# Patient Record
Sex: MALE | Race: ASIAN | NOT HISPANIC OR LATINO
[De-identification: names, ages, dates, MRNs, and addresses within clinical notes are randomized per-mention and may not be internally consistent; named-entity substitution may affect disease eponyms.]

---

## 2018-05-17 ENCOUNTER — RECORDS - HEALTHEAST (OUTPATIENT)
Dept: ADMINISTRATIVE | Facility: OTHER | Age: 18
End: 2018-05-17

## 2018-05-18 ENCOUNTER — RECORDS - HEALTHEAST (OUTPATIENT)
Dept: ADMINISTRATIVE | Facility: OTHER | Age: 18
End: 2018-05-18

## 2018-05-19 ENCOUNTER — RECORDS - HEALTHEAST (OUTPATIENT)
Dept: ADMINISTRATIVE | Facility: OTHER | Age: 18
End: 2018-05-19

## 2018-05-20 ENCOUNTER — RECORDS - HEALTHEAST (OUTPATIENT)
Dept: ADMINISTRATIVE | Facility: OTHER | Age: 18
End: 2018-05-20

## 2018-05-21 ENCOUNTER — RECORDS - HEALTHEAST (OUTPATIENT)
Dept: ADMINISTRATIVE | Facility: OTHER | Age: 18
End: 2018-05-21

## 2018-05-22 ENCOUNTER — RECORDS - HEALTHEAST (OUTPATIENT)
Dept: ADMINISTRATIVE | Facility: OTHER | Age: 18
End: 2018-05-22

## 2018-05-23 ENCOUNTER — RECORDS - HEALTHEAST (OUTPATIENT)
Dept: ADMINISTRATIVE | Facility: OTHER | Age: 18
End: 2018-05-23

## 2018-05-25 ENCOUNTER — RECORDS - HEALTHEAST (OUTPATIENT)
Dept: ADMINISTRATIVE | Facility: OTHER | Age: 18
End: 2018-05-25

## 2018-05-26 ENCOUNTER — RECORDS - HEALTHEAST (OUTPATIENT)
Dept: ADMINISTRATIVE | Facility: OTHER | Age: 18
End: 2018-05-26

## 2018-05-27 ENCOUNTER — RECORDS - HEALTHEAST (OUTPATIENT)
Dept: ADMINISTRATIVE | Facility: OTHER | Age: 18
End: 2018-05-27

## 2018-05-28 ENCOUNTER — RECORDS - HEALTHEAST (OUTPATIENT)
Dept: ADMINISTRATIVE | Facility: OTHER | Age: 18
End: 2018-05-28

## 2018-05-29 ENCOUNTER — RECORDS - HEALTHEAST (OUTPATIENT)
Dept: ADMINISTRATIVE | Facility: OTHER | Age: 18
End: 2018-05-29

## 2019-01-04 ENCOUNTER — COMMUNICATION - HEALTHEAST (OUTPATIENT)
Dept: FAMILY MEDICINE | Facility: CLINIC | Age: 19
End: 2019-01-04

## 2019-01-04 ENCOUNTER — COMMUNICATION - HEALTHEAST (OUTPATIENT)
Dept: NURSING | Facility: CLINIC | Age: 19
End: 2019-01-04

## 2019-01-04 ENCOUNTER — OFFICE VISIT - HEALTHEAST (OUTPATIENT)
Dept: FAMILY MEDICINE | Facility: CLINIC | Age: 19
End: 2019-01-04

## 2019-01-04 DIAGNOSIS — S06.9X5D TRAUMATIC BRAIN INJURY WITH PROLONGED (MORE THAN 24 HOURS) LOSS OF CONSCIOUSNESS WITH RETURN TO PRE-EXISTING CONSCIOUS LEVEL, SUBSEQUENT ENCOUNTER: ICD-10-CM

## 2019-01-04 DIAGNOSIS — R63.0 LOSS OF APPETITE: ICD-10-CM

## 2019-01-04 DIAGNOSIS — R45.6 VIOLENT BEHAVIOR: ICD-10-CM

## 2019-01-04 DIAGNOSIS — R41.3 POOR MEMORY: ICD-10-CM

## 2019-01-04 DIAGNOSIS — S06.9X3A TRAUMATIC BRAIN INJURY, WITH LOSS OF CONSCIOUSNESS OF 1 HOUR TO 5 HOURS 59 MINUTES, INITIAL ENCOUNTER (H): ICD-10-CM

## 2019-01-04 DIAGNOSIS — Z76.89 ESTABLISHING CARE WITH NEW DOCTOR, ENCOUNTER FOR: ICD-10-CM

## 2019-01-04 DIAGNOSIS — R26.89 POOR BALANCE: ICD-10-CM

## 2019-01-04 LAB
ALBUMIN SERPL-MCNC: 4.5 G/DL (ref 3.5–5)
ALBUMIN UR-MCNC: NEGATIVE MG/DL
ALP SERPL-CCNC: 90 U/L (ref 45–120)
ALT SERPL W P-5'-P-CCNC: 22 U/L (ref 0–45)
AMPHETAMINES UR QL SCN: NORMAL
ANION GAP SERPL CALCULATED.3IONS-SCNC: 9 MMOL/L (ref 5–18)
APPEARANCE UR: CLEAR
AST SERPL W P-5'-P-CCNC: 21 U/L (ref 0–40)
BARBITURATES UR QL: NORMAL
BENZODIAZ UR QL: NORMAL
BILIRUB SERPL-MCNC: 0.4 MG/DL (ref 0–1)
BILIRUB UR QL STRIP: NEGATIVE
BUN SERPL-MCNC: 9 MG/DL (ref 8–22)
CALCIUM SERPL-MCNC: 10 MG/DL (ref 8.5–10.5)
CANNABINOIDS UR QL SCN: NORMAL
CHLORIDE BLD-SCNC: 102 MMOL/L (ref 98–107)
CO2 SERPL-SCNC: 31 MMOL/L (ref 22–31)
COCAINE UR QL: NORMAL
COLOR UR AUTO: YELLOW
CREAT SERPL-MCNC: 0.78 MG/DL (ref 0.7–1.3)
CREAT UR-MCNC: 19.8 MG/DL
GFR SERPL CREATININE-BSD FRML MDRD: >60 ML/MIN/1.73M2
GLUCOSE BLD-MCNC: 92 MG/DL (ref 70–125)
GLUCOSE UR STRIP-MCNC: NEGATIVE MG/DL
HGB UR QL STRIP: NEGATIVE
HIV 1+2 AB+HIV1 P24 AG SERPL QL IA: NEGATIVE
KETONES UR STRIP-MCNC: NEGATIVE MG/DL
LDLC SERPL CALC-MCNC: 93 MG/DL
LEUKOCYTE ESTERASE UR QL STRIP: NEGATIVE
METHADONE UR QL SCN: NORMAL
NITRATE UR QL: NEGATIVE
OPIATES UR QL SCN: NORMAL
OXYCODONE UR QL: NORMAL
PCP UR QL SCN: NORMAL
PH UR STRIP: 8 [PH] (ref 5–8)
POTASSIUM BLD-SCNC: 3.8 MMOL/L (ref 3.5–5)
PROT SERPL-MCNC: 7.8 G/DL (ref 6–8)
SODIUM SERPL-SCNC: 142 MMOL/L (ref 136–145)
SP GR UR STRIP: 1.01 (ref 1–1.03)
TSH SERPL DL<=0.005 MIU/L-ACNC: 1.31 UIU/ML (ref 0.3–5)
UROBILINOGEN UR STRIP-ACNC: NORMAL

## 2019-01-04 ASSESSMENT — MIFFLIN-ST. JEOR: SCORE: 1356.91

## 2019-01-05 LAB — HBV SURFACE AG SERPL QL IA: NEGATIVE

## 2019-01-06 LAB — VZV IGG SER QL IA: POSITIVE

## 2019-01-07 LAB
BASOPHILS # BLD AUTO: 0.1 THOU/UL (ref 0–0.2)
BASOPHILS NFR BLD AUTO: 1 % (ref 0–2)
EOSINOPHIL # BLD AUTO: 0.2 THOU/UL (ref 0–0.4)
EOSINOPHIL NFR BLD AUTO: 2 % (ref 0–6)
ERYTHROCYTE [DISTWIDTH] IN BLOOD BY AUTOMATED COUNT: 12.4 % (ref 11–14.5)
HAV IGG SER QL IA: POSITIVE
HBV CORE AB SERPL QL IA: NEGATIVE
HBV SURFACE AB SERPL IA-ACNC: NEGATIVE M[IU]/ML
HCT VFR BLD AUTO: 45.2 % (ref 40–54)
HCV AB SERPL QL IA: NEGATIVE
HGB BLD-MCNC: 15.3 G/DL (ref 14–18)
LYMPHOCYTES # BLD AUTO: 2.5 THOU/UL (ref 0.8–4.4)
LYMPHOCYTES NFR BLD AUTO: 26 % (ref 20–40)
MCH RBC QN AUTO: 30.8 PG (ref 27–34)
MCHC RBC AUTO-ENTMCNC: 33.8 G/DL (ref 32–36)
MCV RBC AUTO: 91 FL (ref 80–100)
MONOCYTES # BLD AUTO: 0.9 THOU/UL (ref 0–0.9)
MONOCYTES NFR BLD AUTO: 9 % (ref 2–10)
NEUTROPHILS # BLD AUTO: 6 THOU/UL (ref 2–7.7)
NEUTROPHILS NFR BLD AUTO: 62 % (ref 50–70)
PLATELET # BLD AUTO: 307 THOU/UL (ref 140–440)
PMV BLD AUTO: 7 FL (ref 7–10)
RBC # BLD AUTO: 4.97 MILL/UL (ref 4.4–6.2)
STRONGYLOIDES IGG SER IA-ACNC: 0 IV
WBC: 9.7 THOU/UL (ref 4–11)

## 2019-01-08 ENCOUNTER — MEDICAL CORRESPONDENCE (OUTPATIENT)
Dept: HEALTH INFORMATION MANAGEMENT | Facility: CLINIC | Age: 19
End: 2019-01-08

## 2019-01-08 ENCOUNTER — TRANSFERRED RECORDS (OUTPATIENT)
Dept: HEALTH INFORMATION MANAGEMENT | Facility: CLINIC | Age: 19
End: 2019-01-08

## 2019-01-08 ENCOUNTER — AMBULATORY - HEALTHEAST (OUTPATIENT)
Dept: CARE COORDINATION | Facility: CLINIC | Age: 19
End: 2019-01-08

## 2019-01-08 ENCOUNTER — COMMUNICATION - HEALTHEAST (OUTPATIENT)
Dept: FAMILY MEDICINE | Facility: CLINIC | Age: 19
End: 2019-01-08

## 2019-01-08 ENCOUNTER — AMBULATORY - HEALTHEAST (OUTPATIENT)
Dept: FAMILY MEDICINE | Facility: CLINIC | Age: 19
End: 2019-01-08

## 2019-01-08 DIAGNOSIS — S06.2X5S: ICD-10-CM

## 2019-01-09 ENCOUNTER — COMMUNICATION - HEALTHEAST (OUTPATIENT)
Dept: FAMILY MEDICINE | Facility: CLINIC | Age: 19
End: 2019-01-09

## 2019-01-09 ENCOUNTER — AMBULATORY - HEALTHEAST (OUTPATIENT)
Dept: CARE COORDINATION | Facility: CLINIC | Age: 19
End: 2019-01-09

## 2019-01-10 LAB — SCHISTOSOMA IGG SER QL: NEGATIVE

## 2019-01-15 ENCOUNTER — COMMUNICATION - HEALTHEAST (OUTPATIENT)
Dept: FAMILY MEDICINE | Facility: CLINIC | Age: 19
End: 2019-01-15

## 2019-01-15 ENCOUNTER — COMMUNICATION - HEALTHEAST (OUTPATIENT)
Dept: NURSING | Facility: CLINIC | Age: 19
End: 2019-01-15

## 2019-01-18 ENCOUNTER — COMMUNICATION - HEALTHEAST (OUTPATIENT)
Dept: CARE COORDINATION | Facility: CLINIC | Age: 19
End: 2019-01-18

## 2019-01-21 ENCOUNTER — AMBULATORY - HEALTHEAST (OUTPATIENT)
Dept: NURSING | Facility: CLINIC | Age: 19
End: 2019-01-21

## 2019-01-22 ENCOUNTER — AMBULATORY - HEALTHEAST (OUTPATIENT)
Dept: FAMILY MEDICINE | Facility: CLINIC | Age: 19
End: 2019-01-22

## 2019-01-24 ENCOUNTER — COMMUNICATION - HEALTHEAST (OUTPATIENT)
Dept: NURSING | Facility: CLINIC | Age: 19
End: 2019-01-24

## 2019-01-25 ENCOUNTER — OFFICE VISIT - HEALTHEAST (OUTPATIENT)
Dept: FAMILY MEDICINE | Facility: CLINIC | Age: 19
End: 2019-01-25

## 2019-01-25 DIAGNOSIS — S06.9X3A TRAUMATIC BRAIN INJURY, WITH LOSS OF CONSCIOUSNESS OF 1 HOUR TO 5 HOURS 59 MINUTES, INITIAL ENCOUNTER (H): ICD-10-CM

## 2019-01-25 DIAGNOSIS — E86.0 DEHYDRATION: ICD-10-CM

## 2019-01-25 DIAGNOSIS — R63.0 LOSS OF APPETITE: ICD-10-CM

## 2019-01-25 DIAGNOSIS — R26.89 POOR BALANCE: ICD-10-CM

## 2019-01-25 DIAGNOSIS — G47.00 INSOMNIA, UNSPECIFIED TYPE: ICD-10-CM

## 2019-01-25 DIAGNOSIS — F29 PSYCHOSIS, UNSPECIFIED PSYCHOSIS TYPE (H): ICD-10-CM

## 2019-01-25 DIAGNOSIS — Z23 NEED FOR IMMUNIZATION AGAINST INFLUENZA: ICD-10-CM

## 2019-01-25 DIAGNOSIS — F39 EPISODIC MOOD DISORDER (H): ICD-10-CM

## 2019-01-25 DIAGNOSIS — Z23 NEED FOR HEPATITIS B VACCINATION: ICD-10-CM

## 2019-01-25 ASSESSMENT — MIFFLIN-ST. JEOR: SCORE: 1346.17

## 2019-01-29 ENCOUNTER — COMMUNICATION - HEALTHEAST (OUTPATIENT)
Dept: NURSING | Facility: CLINIC | Age: 19
End: 2019-01-29

## 2019-01-29 ENCOUNTER — OFFICE VISIT - HEALTHEAST (OUTPATIENT)
Dept: BEHAVIORAL HEALTH | Facility: CLINIC | Age: 19
End: 2019-01-29

## 2019-01-29 DIAGNOSIS — F41.9 ANXIETY: ICD-10-CM

## 2019-01-29 DIAGNOSIS — F29 PSYCHOSIS, UNSPECIFIED PSYCHOSIS TYPE (H): ICD-10-CM

## 2019-01-29 DIAGNOSIS — R26.89 BALANCE PROBLEMS: ICD-10-CM

## 2019-01-29 DIAGNOSIS — F39 EPISODIC MOOD DISORDER (H): ICD-10-CM

## 2019-01-29 DIAGNOSIS — G47.00 INSOMNIA, UNSPECIFIED TYPE: ICD-10-CM

## 2019-01-29 DIAGNOSIS — S06.9X3S: ICD-10-CM

## 2019-01-29 ASSESSMENT — MIFFLIN-ST. JEOR: SCORE: 1359.6

## 2019-01-30 ENCOUNTER — COMMUNICATION - HEALTHEAST (OUTPATIENT)
Dept: NURSING | Facility: CLINIC | Age: 19
End: 2019-01-30

## 2019-01-30 ENCOUNTER — AMBULATORY - HEALTHEAST (OUTPATIENT)
Dept: NURSING | Facility: CLINIC | Age: 19
End: 2019-01-30

## 2019-01-31 ENCOUNTER — COMMUNICATION - HEALTHEAST (OUTPATIENT)
Dept: NURSING | Facility: CLINIC | Age: 19
End: 2019-01-31

## 2019-02-01 ENCOUNTER — AMBULATORY - HEALTHEAST (OUTPATIENT)
Dept: FAMILY MEDICINE | Facility: CLINIC | Age: 19
End: 2019-02-01

## 2019-02-01 DIAGNOSIS — R29.898 WEAKNESS OF LEFT LEG: ICD-10-CM

## 2019-02-01 DIAGNOSIS — R26.89 LIMP: ICD-10-CM

## 2019-02-05 ENCOUNTER — OFFICE VISIT - HEALTHEAST (OUTPATIENT)
Dept: FAMILY MEDICINE | Facility: CLINIC | Age: 19
End: 2019-02-05

## 2019-02-05 DIAGNOSIS — F29 PSYCHOSIS, UNSPECIFIED PSYCHOSIS TYPE (H): ICD-10-CM

## 2019-02-05 DIAGNOSIS — K59.01 SLOW TRANSIT CONSTIPATION: ICD-10-CM

## 2019-02-05 DIAGNOSIS — R45.6 VIOLENT BEHAVIOR: ICD-10-CM

## 2019-02-05 DIAGNOSIS — R41.3 POOR MEMORY: ICD-10-CM

## 2019-02-05 DIAGNOSIS — S06.9X3A TRAUMATIC BRAIN INJURY, WITH LOSS OF CONSCIOUSNESS OF 1 HOUR TO 5 HOURS 59 MINUTES, INITIAL ENCOUNTER (H): ICD-10-CM

## 2019-02-05 DIAGNOSIS — R26.89 POOR BALANCE: ICD-10-CM

## 2019-02-05 ASSESSMENT — MIFFLIN-ST. JEOR: SCORE: 1351.07

## 2019-02-06 ENCOUNTER — COMMUNICATION - HEALTHEAST (OUTPATIENT)
Dept: NURSING | Facility: CLINIC | Age: 19
End: 2019-02-06

## 2019-02-06 ENCOUNTER — AMBULATORY - HEALTHEAST (OUTPATIENT)
Dept: NURSING | Facility: CLINIC | Age: 19
End: 2019-02-06

## 2019-02-06 ENCOUNTER — COMMUNICATION - HEALTHEAST (OUTPATIENT)
Dept: CARE COORDINATION | Facility: CLINIC | Age: 19
End: 2019-02-06

## 2019-02-06 DIAGNOSIS — F39 EPISODIC MOOD DISORDER (H): ICD-10-CM

## 2019-02-12 ENCOUNTER — COMMUNICATION - HEALTHEAST (OUTPATIENT)
Dept: NURSING | Facility: CLINIC | Age: 19
End: 2019-02-12

## 2019-02-13 ENCOUNTER — AMBULATORY - HEALTHEAST (OUTPATIENT)
Dept: NURSING | Facility: CLINIC | Age: 19
End: 2019-02-13

## 2019-02-15 ENCOUNTER — OFFICE VISIT - HEALTHEAST (OUTPATIENT)
Dept: FAMILY MEDICINE | Facility: CLINIC | Age: 19
End: 2019-02-15

## 2019-02-15 ENCOUNTER — COMMUNICATION - HEALTHEAST (OUTPATIENT)
Dept: FAMILY MEDICINE | Facility: CLINIC | Age: 19
End: 2019-02-15

## 2019-02-15 DIAGNOSIS — R41.3 POOR MEMORY: ICD-10-CM

## 2019-02-15 DIAGNOSIS — F29 PSYCHOSIS, UNSPECIFIED PSYCHOSIS TYPE (H): ICD-10-CM

## 2019-02-15 DIAGNOSIS — F41.9 ANXIETY: ICD-10-CM

## 2019-02-15 DIAGNOSIS — R45.6 VIOLENT BEHAVIOR: ICD-10-CM

## 2019-02-15 DIAGNOSIS — S06.9X0D TRAUMATIC BRAIN INJURY, WITHOUT LOSS OF CONSCIOUSNESS, SUBSEQUENT ENCOUNTER: ICD-10-CM

## 2019-02-15 DIAGNOSIS — S06.9X3A TRAUMATIC BRAIN INJURY, WITH LOSS OF CONSCIOUSNESS OF 1 HOUR TO 5 HOURS 59 MINUTES, INITIAL ENCOUNTER (H): ICD-10-CM

## 2019-02-15 DIAGNOSIS — R26.89 POOR BALANCE: ICD-10-CM

## 2019-02-15 DIAGNOSIS — K59.01 SLOW TRANSIT CONSTIPATION: ICD-10-CM

## 2019-02-15 RX ORDER — HYDROXYZINE HYDROCHLORIDE 25 MG/1
25 TABLET, FILM COATED ORAL 2 TIMES DAILY PRN
Qty: 90 TABLET | Refills: 6 | Status: SHIPPED | OUTPATIENT
Start: 2019-02-15

## 2019-02-15 ASSESSMENT — MIFFLIN-ST. JEOR: SCORE: 1359.6

## 2019-02-18 ENCOUNTER — COMMUNICATION - HEALTHEAST (OUTPATIENT)
Dept: NURSING | Facility: CLINIC | Age: 19
End: 2019-02-18

## 2019-02-18 ENCOUNTER — AMBULATORY - HEALTHEAST (OUTPATIENT)
Dept: CARE COORDINATION | Facility: CLINIC | Age: 19
End: 2019-02-18

## 2019-02-20 ENCOUNTER — COMMUNICATION - HEALTHEAST (OUTPATIENT)
Dept: NURSING | Facility: CLINIC | Age: 19
End: 2019-02-20

## 2019-02-21 ENCOUNTER — COMMUNICATION - HEALTHEAST (OUTPATIENT)
Dept: FAMILY MEDICINE | Facility: CLINIC | Age: 19
End: 2019-02-21

## 2019-02-26 ENCOUNTER — COMMUNICATION - HEALTHEAST (OUTPATIENT)
Dept: NURSING | Facility: CLINIC | Age: 19
End: 2019-02-26

## 2019-03-06 ENCOUNTER — COMMUNICATION - HEALTHEAST (OUTPATIENT)
Dept: NURSING | Facility: CLINIC | Age: 19
End: 2019-03-06

## 2019-03-06 ENCOUNTER — OFFICE VISIT - HEALTHEAST (OUTPATIENT)
Dept: FAMILY MEDICINE | Facility: CLINIC | Age: 19
End: 2019-03-06

## 2019-03-06 DIAGNOSIS — R41.3 POOR MEMORY: ICD-10-CM

## 2019-03-06 DIAGNOSIS — F29 PSYCHOSIS, UNSPECIFIED PSYCHOSIS TYPE (H): ICD-10-CM

## 2019-03-06 DIAGNOSIS — Z00.00 HEALTHCARE MAINTENANCE: ICD-10-CM

## 2019-03-06 DIAGNOSIS — S06.9X3A TRAUMATIC BRAIN INJURY, WITH LOSS OF CONSCIOUSNESS OF 1 HOUR TO 5 HOURS 59 MINUTES, INITIAL ENCOUNTER (H): ICD-10-CM

## 2019-03-06 DIAGNOSIS — R45.6 VIOLENT BEHAVIOR: ICD-10-CM

## 2019-03-06 DIAGNOSIS — K59.01 SLOW TRANSIT CONSTIPATION: ICD-10-CM

## 2019-03-06 ASSESSMENT — MIFFLIN-ST. JEOR: SCORE: 1350.53

## 2019-03-13 ENCOUNTER — COMMUNICATION - HEALTHEAST (OUTPATIENT)
Dept: NURSING | Facility: CLINIC | Age: 19
End: 2019-03-13

## 2019-03-20 ENCOUNTER — COMMUNICATION - HEALTHEAST (OUTPATIENT)
Dept: NURSING | Facility: CLINIC | Age: 19
End: 2019-03-20

## 2019-03-27 ENCOUNTER — COMMUNICATION - HEALTHEAST (OUTPATIENT)
Dept: FAMILY MEDICINE | Facility: CLINIC | Age: 19
End: 2019-03-27

## 2019-03-27 ENCOUNTER — OFFICE VISIT - HEALTHEAST (OUTPATIENT)
Dept: FAMILY MEDICINE | Facility: CLINIC | Age: 19
End: 2019-03-27

## 2019-03-27 DIAGNOSIS — R41.3 POOR MEMORY: ICD-10-CM

## 2019-03-27 DIAGNOSIS — F29 PSYCHOSIS, UNSPECIFIED PSYCHOSIS TYPE (H): ICD-10-CM

## 2019-03-27 DIAGNOSIS — R45.6 VIOLENT BEHAVIOR: ICD-10-CM

## 2019-03-27 DIAGNOSIS — S06.9X3A TRAUMATIC BRAIN INJURY, WITH LOSS OF CONSCIOUSNESS OF 1 HOUR TO 5 HOURS 59 MINUTES, INITIAL ENCOUNTER (H): ICD-10-CM

## 2019-03-27 DIAGNOSIS — F39 EPISODIC MOOD DISORDER (H): ICD-10-CM

## 2019-03-27 DIAGNOSIS — F32.1 CURRENT MODERATE EPISODE OF MAJOR DEPRESSIVE DISORDER, UNSPECIFIED WHETHER RECURRENT (H): ICD-10-CM

## 2019-03-27 DIAGNOSIS — F32.9 MAJOR DEPRESSIVE DISORDER WITH CURRENT ACTIVE EPISODE, UNSPECIFIED DEPRESSION EPISODE SEVERITY, UNSPECIFIED WHETHER RECURRENT: ICD-10-CM

## 2019-03-27 RX ORDER — DIVALPROEX SODIUM 500 MG/1
1500 TABLET, EXTENDED RELEASE ORAL AT BEDTIME
Qty: 90 TABLET | Refills: 6 | Status: SHIPPED | OUTPATIENT
Start: 2019-03-27

## 2019-03-27 ASSESSMENT — MIFFLIN-ST. JEOR: SCORE: 1357.33

## 2019-03-28 ENCOUNTER — COMMUNICATION - HEALTHEAST (OUTPATIENT)
Dept: BEHAVIORAL HEALTH | Facility: CLINIC | Age: 19
End: 2019-03-28

## 2019-03-28 ENCOUNTER — COMMUNICATION - HEALTHEAST (OUTPATIENT)
Dept: FAMILY MEDICINE | Facility: CLINIC | Age: 19
End: 2019-03-28

## 2019-03-29 ENCOUNTER — AMBULATORY - HEALTHEAST (OUTPATIENT)
Dept: NURSING | Facility: CLINIC | Age: 19
End: 2019-03-29

## 2019-04-02 ENCOUNTER — OFFICE VISIT - HEALTHEAST (OUTPATIENT)
Dept: BEHAVIORAL HEALTH | Facility: CLINIC | Age: 19
End: 2019-04-02

## 2019-04-02 DIAGNOSIS — F39 EPISODIC MOOD DISORDER (H): ICD-10-CM

## 2019-04-02 DIAGNOSIS — F32.A DEPRESSION, UNSPECIFIED DEPRESSION TYPE: ICD-10-CM

## 2019-04-02 ASSESSMENT — MIFFLIN-ST. JEOR: SCORE: 1359.6

## 2019-04-03 ENCOUNTER — AMBULATORY - HEALTHEAST (OUTPATIENT)
Dept: CARE COORDINATION | Facility: CLINIC | Age: 19
End: 2019-04-03

## 2019-04-05 ENCOUNTER — COMMUNICATION - HEALTHEAST (OUTPATIENT)
Dept: CARE COORDINATION | Facility: CLINIC | Age: 19
End: 2019-04-05

## 2019-04-16 ENCOUNTER — OFFICE VISIT - HEALTHEAST (OUTPATIENT)
Dept: FAMILY MEDICINE | Facility: CLINIC | Age: 19
End: 2019-04-16

## 2019-04-23 ENCOUNTER — COMMUNICATION - HEALTHEAST (OUTPATIENT)
Dept: NURSING | Facility: CLINIC | Age: 19
End: 2019-04-23

## 2019-04-29 ENCOUNTER — OFFICE VISIT - HEALTHEAST (OUTPATIENT)
Dept: BEHAVIORAL HEALTH | Facility: CLINIC | Age: 19
End: 2019-04-29

## 2019-04-29 DIAGNOSIS — S06.9X6A: ICD-10-CM

## 2019-04-29 ASSESSMENT — MIFFLIN-ST. JEOR: SCORE: 1377.74

## 2019-04-30 ENCOUNTER — OFFICE VISIT - HEALTHEAST (OUTPATIENT)
Dept: BEHAVIORAL HEALTH | Facility: CLINIC | Age: 19
End: 2019-04-30

## 2019-04-30 DIAGNOSIS — G47.00 INSOMNIA, UNSPECIFIED TYPE: ICD-10-CM

## 2019-04-30 DIAGNOSIS — F32.A DEPRESSION, UNSPECIFIED DEPRESSION TYPE: ICD-10-CM

## 2019-04-30 RX ORDER — TRAZODONE HYDROCHLORIDE 50 MG/1
50 TABLET, FILM COATED ORAL
Qty: 30 TABLET | Refills: 3 | Status: SHIPPED | OUTPATIENT
Start: 2019-04-30

## 2019-04-30 ASSESSMENT — MIFFLIN-ST. JEOR: SCORE: 1377.74

## 2019-05-02 ENCOUNTER — HOME CARE/HOSPICE - HEALTHEAST (OUTPATIENT)
Dept: HOME HEALTH SERVICES | Facility: HOME HEALTH | Age: 19
End: 2019-05-02

## 2019-05-02 ENCOUNTER — COMMUNICATION - HEALTHEAST (OUTPATIENT)
Dept: NURSING | Facility: CLINIC | Age: 19
End: 2019-05-02

## 2019-05-02 ENCOUNTER — AMBULATORY - HEALTHEAST (OUTPATIENT)
Dept: CARE COORDINATION | Facility: CLINIC | Age: 19
End: 2019-05-02

## 2019-05-02 DIAGNOSIS — S06.9X3A TRAUMATIC BRAIN INJURY, WITH LOSS OF CONSCIOUSNESS OF 1 HOUR TO 5 HOURS 59 MINUTES, INITIAL ENCOUNTER (H): ICD-10-CM

## 2019-05-02 DIAGNOSIS — F33.9 EPISODE OF RECURRENT MAJOR DEPRESSIVE DISORDER, UNSPECIFIED DEPRESSION EPISODE SEVERITY (H): ICD-10-CM

## 2019-05-02 DIAGNOSIS — F29 PSYCHOSIS, UNSPECIFIED PSYCHOSIS TYPE (H): ICD-10-CM

## 2019-05-03 ENCOUNTER — COMMUNICATION - HEALTHEAST (OUTPATIENT)
Dept: HOME HEALTH SERVICES | Facility: HOME HEALTH | Age: 19
End: 2019-05-03

## 2019-05-03 ENCOUNTER — COMMUNICATION - HEALTHEAST (OUTPATIENT)
Dept: NURSING | Facility: CLINIC | Age: 19
End: 2019-05-03

## 2019-05-05 ENCOUNTER — HOME CARE/HOSPICE - HEALTHEAST (OUTPATIENT)
Dept: HOME HEALTH SERVICES | Facility: HOME HEALTH | Age: 19
End: 2019-05-05

## 2019-05-05 ENCOUNTER — COMMUNICATION - HEALTHEAST (OUTPATIENT)
Dept: HOME HEALTH SERVICES | Facility: HOME HEALTH | Age: 19
End: 2019-05-05

## 2019-05-06 ENCOUNTER — AMBULATORY - HEALTHEAST (OUTPATIENT)
Dept: NURSING | Facility: CLINIC | Age: 19
End: 2019-05-06

## 2019-05-08 ENCOUNTER — COMMUNICATION - HEALTHEAST (OUTPATIENT)
Dept: HOME HEALTH SERVICES | Facility: HOME HEALTH | Age: 19
End: 2019-05-08

## 2019-05-08 ENCOUNTER — HOME CARE/HOSPICE - HEALTHEAST (OUTPATIENT)
Dept: HOME HEALTH SERVICES | Facility: HOME HEALTH | Age: 19
End: 2019-05-08

## 2019-05-09 ENCOUNTER — HOME CARE/HOSPICE - HEALTHEAST (OUTPATIENT)
Dept: HOME HEALTH SERVICES | Facility: HOME HEALTH | Age: 19
End: 2019-05-09

## 2019-05-10 ENCOUNTER — OFFICE VISIT - HEALTHEAST (OUTPATIENT)
Dept: FAMILY MEDICINE | Facility: CLINIC | Age: 19
End: 2019-05-10

## 2019-05-10 DIAGNOSIS — F29 PSYCHOSIS, UNSPECIFIED PSYCHOSIS TYPE (H): ICD-10-CM

## 2019-05-10 DIAGNOSIS — S06.9X3A TRAUMATIC BRAIN INJURY, WITH LOSS OF CONSCIOUSNESS OF 1 HOUR TO 5 HOURS 59 MINUTES, INITIAL ENCOUNTER (H): ICD-10-CM

## 2019-05-10 DIAGNOSIS — R41.3 POOR MEMORY: ICD-10-CM

## 2019-05-10 DIAGNOSIS — Z23 IMMUNIZATION DUE: ICD-10-CM

## 2019-05-10 DIAGNOSIS — R26.89 POOR BALANCE: ICD-10-CM

## 2019-05-10 DIAGNOSIS — F41.0 PANIC DISORDER WITHOUT AGORAPHOBIA: ICD-10-CM

## 2019-05-10 DIAGNOSIS — F32.1 CURRENT MODERATE EPISODE OF MAJOR DEPRESSIVE DISORDER, UNSPECIFIED WHETHER RECURRENT (H): ICD-10-CM

## 2019-05-10 DIAGNOSIS — R45.6 VIOLENT BEHAVIOR: ICD-10-CM

## 2019-05-10 ASSESSMENT — MIFFLIN-ST. JEOR: SCORE: 1391.35

## 2019-05-11 ENCOUNTER — HOME CARE/HOSPICE - HEALTHEAST (OUTPATIENT)
Dept: HOME HEALTH SERVICES | Facility: HOME HEALTH | Age: 19
End: 2019-05-11

## 2019-05-11 ENCOUNTER — COMMUNICATION - HEALTHEAST (OUTPATIENT)
Dept: PHYSICAL THERAPY | Age: 19
End: 2019-05-11

## 2019-05-14 ENCOUNTER — HOME CARE/HOSPICE - HEALTHEAST (OUTPATIENT)
Dept: HOME HEALTH SERVICES | Facility: HOME HEALTH | Age: 19
End: 2019-05-14

## 2019-05-15 ENCOUNTER — HOME CARE/HOSPICE - HEALTHEAST (OUTPATIENT)
Dept: HOME HEALTH SERVICES | Facility: HOME HEALTH | Age: 19
End: 2019-05-15

## 2019-05-16 ENCOUNTER — HOME CARE/HOSPICE - HEALTHEAST (OUTPATIENT)
Dept: HOME HEALTH SERVICES | Facility: HOME HEALTH | Age: 19
End: 2019-05-16

## 2019-05-17 ENCOUNTER — HOME CARE/HOSPICE - HEALTHEAST (OUTPATIENT)
Dept: HOME HEALTH SERVICES | Facility: HOME HEALTH | Age: 19
End: 2019-05-17

## 2019-05-17 ENCOUNTER — RECORDS - HEALTHEAST (OUTPATIENT)
Dept: ADMINISTRATIVE | Facility: OTHER | Age: 19
End: 2019-05-17

## 2019-05-21 ENCOUNTER — HOME CARE/HOSPICE - HEALTHEAST (OUTPATIENT)
Dept: HOME HEALTH SERVICES | Facility: HOME HEALTH | Age: 19
End: 2019-05-21

## 2019-05-22 ENCOUNTER — COMMUNICATION - HEALTHEAST (OUTPATIENT)
Dept: NURSING | Facility: CLINIC | Age: 19
End: 2019-05-22

## 2019-05-22 ENCOUNTER — HOME CARE/HOSPICE - HEALTHEAST (OUTPATIENT)
Dept: HOME HEALTH SERVICES | Facility: HOME HEALTH | Age: 19
End: 2019-05-22

## 2019-05-23 ENCOUNTER — HOME CARE/HOSPICE - HEALTHEAST (OUTPATIENT)
Dept: HOME HEALTH SERVICES | Facility: HOME HEALTH | Age: 19
End: 2019-05-23

## 2019-05-23 ENCOUNTER — COMMUNICATION - HEALTHEAST (OUTPATIENT)
Dept: HOME HEALTH SERVICES | Facility: HOME HEALTH | Age: 19
End: 2019-05-23

## 2019-05-23 ENCOUNTER — COMMUNICATION - HEALTHEAST (OUTPATIENT)
Dept: NURSING | Facility: CLINIC | Age: 19
End: 2019-05-23

## 2019-05-23 ENCOUNTER — COMMUNICATION - HEALTHEAST (OUTPATIENT)
Dept: FAMILY MEDICINE | Facility: CLINIC | Age: 19
End: 2019-05-23

## 2019-05-28 ENCOUNTER — COMMUNICATION - HEALTHEAST (OUTPATIENT)
Dept: FAMILY MEDICINE | Facility: CLINIC | Age: 19
End: 2019-05-28

## 2019-05-29 ENCOUNTER — HOME CARE/HOSPICE - HEALTHEAST (OUTPATIENT)
Dept: HOME HEALTH SERVICES | Facility: HOME HEALTH | Age: 19
End: 2019-05-29

## 2019-05-29 ENCOUNTER — AMBULATORY - HEALTHEAST (OUTPATIENT)
Dept: CARE COORDINATION | Facility: CLINIC | Age: 19
End: 2019-05-29

## 2019-05-30 ENCOUNTER — HOME CARE/HOSPICE - HEALTHEAST (OUTPATIENT)
Dept: HOME HEALTH SERVICES | Facility: HOME HEALTH | Age: 19
End: 2019-05-30

## 2019-05-31 ENCOUNTER — HOME CARE/HOSPICE - HEALTHEAST (OUTPATIENT)
Dept: HOME HEALTH SERVICES | Facility: HOME HEALTH | Age: 19
End: 2019-05-31

## 2019-06-04 ENCOUNTER — HOME CARE/HOSPICE - HEALTHEAST (OUTPATIENT)
Dept: HOME HEALTH SERVICES | Facility: HOME HEALTH | Age: 19
End: 2019-06-04

## 2019-06-04 ENCOUNTER — AMBULATORY - HEALTHEAST (OUTPATIENT)
Dept: CARE COORDINATION | Facility: CLINIC | Age: 19
End: 2019-06-04

## 2019-06-04 ENCOUNTER — COMMUNICATION - HEALTHEAST (OUTPATIENT)
Dept: NURSING | Facility: CLINIC | Age: 19
End: 2019-06-04

## 2019-06-05 ENCOUNTER — HOME CARE/HOSPICE - HEALTHEAST (OUTPATIENT)
Dept: HOME HEALTH SERVICES | Facility: HOME HEALTH | Age: 19
End: 2019-06-05

## 2019-06-06 ENCOUNTER — COMMUNICATION - HEALTHEAST (OUTPATIENT)
Dept: FAMILY MEDICINE | Facility: CLINIC | Age: 19
End: 2019-06-06

## 2019-06-07 ENCOUNTER — HOME CARE/HOSPICE - HEALTHEAST (OUTPATIENT)
Dept: HOME HEALTH SERVICES | Facility: HOME HEALTH | Age: 19
End: 2019-06-07

## 2019-06-07 ENCOUNTER — AMBULATORY - HEALTHEAST (OUTPATIENT)
Dept: FAMILY MEDICINE | Facility: CLINIC | Age: 19
End: 2019-06-07

## 2019-06-07 DIAGNOSIS — S06.9X3A TRAUMATIC BRAIN INJURY, WITH LOSS OF CONSCIOUSNESS OF 1 HOUR TO 5 HOURS 59 MINUTES, INITIAL ENCOUNTER (H): ICD-10-CM

## 2019-06-07 DIAGNOSIS — R45.6 VIOLENT BEHAVIOR: ICD-10-CM

## 2019-06-07 DIAGNOSIS — F29 PSYCHOSIS, UNSPECIFIED PSYCHOSIS TYPE (H): ICD-10-CM

## 2019-06-10 ENCOUNTER — HOME CARE/HOSPICE - HEALTHEAST (OUTPATIENT)
Dept: HOME HEALTH SERVICES | Facility: HOME HEALTH | Age: 19
End: 2019-06-10

## 2019-06-12 ENCOUNTER — HOME CARE/HOSPICE - HEALTHEAST (OUTPATIENT)
Dept: HOME HEALTH SERVICES | Facility: HOME HEALTH | Age: 19
End: 2019-06-12

## 2019-06-13 ENCOUNTER — HOME CARE/HOSPICE - HEALTHEAST (OUTPATIENT)
Dept: HOME HEALTH SERVICES | Facility: HOME HEALTH | Age: 19
End: 2019-06-13

## 2019-06-14 ENCOUNTER — AMBULATORY - HEALTHEAST (OUTPATIENT)
Dept: CARE COORDINATION | Facility: CLINIC | Age: 19
End: 2019-06-14

## 2019-06-14 ENCOUNTER — COMMUNICATION - HEALTHEAST (OUTPATIENT)
Dept: HOME HEALTH SERVICES | Facility: HOME HEALTH | Age: 19
End: 2019-06-14

## 2019-06-14 DIAGNOSIS — F29 PSYCHOSIS, UNSPECIFIED PSYCHOSIS TYPE (H): ICD-10-CM

## 2019-06-14 DIAGNOSIS — S06.9X3A TRAUMATIC BRAIN INJURY, WITH LOSS OF CONSCIOUSNESS OF 1 HOUR TO 5 HOURS 59 MINUTES, INITIAL ENCOUNTER (H): ICD-10-CM

## 2019-06-17 ENCOUNTER — AMBULATORY - HEALTHEAST (OUTPATIENT)
Dept: CARE COORDINATION | Facility: CLINIC | Age: 19
End: 2019-06-17

## 2019-06-17 ENCOUNTER — HOME CARE/HOSPICE - HEALTHEAST (OUTPATIENT)
Dept: HOME HEALTH SERVICES | Facility: HOME HEALTH | Age: 19
End: 2019-06-17

## 2019-06-18 ENCOUNTER — COMMUNICATION - HEALTHEAST (OUTPATIENT)
Dept: NURSING | Facility: CLINIC | Age: 19
End: 2019-06-18

## 2019-06-18 ENCOUNTER — COMMUNICATION - HEALTHEAST (OUTPATIENT)
Dept: CARE COORDINATION | Facility: CLINIC | Age: 19
End: 2019-06-18

## 2019-06-18 ENCOUNTER — AMBULATORY - HEALTHEAST (OUTPATIENT)
Dept: NURSING | Facility: CLINIC | Age: 19
End: 2019-06-18

## 2019-06-19 ENCOUNTER — COMMUNICATION - HEALTHEAST (OUTPATIENT)
Dept: NURSING | Facility: CLINIC | Age: 19
End: 2019-06-19

## 2019-06-19 ENCOUNTER — AMBULATORY - HEALTHEAST (OUTPATIENT)
Dept: CARE COORDINATION | Facility: CLINIC | Age: 19
End: 2019-06-19

## 2019-06-19 ENCOUNTER — HOME CARE/HOSPICE - HEALTHEAST (OUTPATIENT)
Dept: HOME HEALTH SERVICES | Facility: HOME HEALTH | Age: 19
End: 2019-06-19

## 2019-06-20 ENCOUNTER — AMBULATORY - HEALTHEAST (OUTPATIENT)
Dept: NURSING | Facility: CLINIC | Age: 19
End: 2019-06-20

## 2019-06-20 ENCOUNTER — COMMUNICATION - HEALTHEAST (OUTPATIENT)
Dept: FAMILY MEDICINE | Facility: CLINIC | Age: 19
End: 2019-06-20

## 2019-06-20 DIAGNOSIS — F32.A DEPRESSION, UNSPECIFIED DEPRESSION TYPE: ICD-10-CM

## 2019-06-21 ENCOUNTER — HOME CARE/HOSPICE - HEALTHEAST (OUTPATIENT)
Dept: HOME HEALTH SERVICES | Facility: HOME HEALTH | Age: 19
End: 2019-06-21

## 2019-06-21 RX ORDER — SERTRALINE HYDROCHLORIDE 100 MG/1
100 TABLET, FILM COATED ORAL DAILY
Qty: 90 TABLET | Refills: 4 | Status: SHIPPED | OUTPATIENT
Start: 2019-06-21

## 2019-06-24 ENCOUNTER — AMBULATORY - HEALTHEAST (OUTPATIENT)
Dept: CARE COORDINATION | Facility: CLINIC | Age: 19
End: 2019-06-24

## 2019-06-24 ENCOUNTER — COMMUNICATION - HEALTHEAST (OUTPATIENT)
Dept: HOME HEALTH SERVICES | Facility: HOME HEALTH | Age: 19
End: 2019-06-24

## 2019-06-25 ENCOUNTER — COMMUNICATION - HEALTHEAST (OUTPATIENT)
Dept: NURSING | Facility: CLINIC | Age: 19
End: 2019-06-25

## 2019-06-25 ENCOUNTER — HOME CARE/HOSPICE - HEALTHEAST (OUTPATIENT)
Dept: HOME HEALTH SERVICES | Facility: HOME HEALTH | Age: 19
End: 2019-06-25

## 2019-06-25 ENCOUNTER — AMBULATORY - HEALTHEAST (OUTPATIENT)
Dept: CARE COORDINATION | Facility: CLINIC | Age: 19
End: 2019-06-25

## 2019-06-25 DIAGNOSIS — Z71.89 COUNSELING AND COORDINATION OF CARE: ICD-10-CM

## 2019-06-26 ENCOUNTER — HOME CARE/HOSPICE - HEALTHEAST (OUTPATIENT)
Dept: HOME HEALTH SERVICES | Facility: HOME HEALTH | Age: 19
End: 2019-06-26

## 2019-06-28 ENCOUNTER — HOME CARE/HOSPICE - HEALTHEAST (OUTPATIENT)
Dept: HOME HEALTH SERVICES | Facility: HOME HEALTH | Age: 19
End: 2019-06-28

## 2019-07-02 ENCOUNTER — COMMUNICATION - HEALTHEAST (OUTPATIENT)
Dept: CARE COORDINATION | Facility: CLINIC | Age: 19
End: 2019-07-02

## 2019-07-02 ENCOUNTER — OFFICE VISIT - HEALTHEAST (OUTPATIENT)
Dept: FAMILY MEDICINE | Facility: CLINIC | Age: 19
End: 2019-07-02

## 2019-07-02 ENCOUNTER — COMMUNICATION - HEALTHEAST (OUTPATIENT)
Dept: FAMILY MEDICINE | Facility: CLINIC | Age: 19
End: 2019-07-02

## 2019-07-02 DIAGNOSIS — F29 PSYCHOSIS, UNSPECIFIED PSYCHOSIS TYPE (H): ICD-10-CM

## 2019-07-02 DIAGNOSIS — R45.6 VIOLENT BEHAVIOR: ICD-10-CM

## 2019-07-02 DIAGNOSIS — S06.9X3A TRAUMATIC BRAIN INJURY, WITH LOSS OF CONSCIOUSNESS OF 1 HOUR TO 5 HOURS 59 MINUTES, INITIAL ENCOUNTER (H): ICD-10-CM

## 2019-07-02 DIAGNOSIS — F52.9 SEXUAL DISORDER: ICD-10-CM

## 2019-07-02 DIAGNOSIS — F41.0 PANIC DISORDER WITHOUT AGORAPHOBIA: ICD-10-CM

## 2019-07-02 DIAGNOSIS — Z23 IMMUNIZATION DUE: ICD-10-CM

## 2019-07-02 DIAGNOSIS — R26.89 POOR BALANCE: ICD-10-CM

## 2019-07-02 DIAGNOSIS — R41.3 POOR MEMORY: ICD-10-CM

## 2019-07-02 DIAGNOSIS — R04.0 EPISTAXIS: ICD-10-CM

## 2019-07-02 ASSESSMENT — MIFFLIN-ST. JEOR: SCORE: 1381.15

## 2019-07-03 ENCOUNTER — HOME CARE/HOSPICE - HEALTHEAST (OUTPATIENT)
Dept: HOME HEALTH SERVICES | Facility: HOME HEALTH | Age: 19
End: 2019-07-03

## 2019-07-04 ENCOUNTER — HOME CARE/HOSPICE - HEALTHEAST (OUTPATIENT)
Dept: HOME HEALTH SERVICES | Facility: HOME HEALTH | Age: 19
End: 2019-07-04

## 2019-07-07 ENCOUNTER — HOME CARE/HOSPICE - HEALTHEAST (OUTPATIENT)
Dept: HOME HEALTH SERVICES | Facility: HOME HEALTH | Age: 19
End: 2019-07-07

## 2019-07-10 ENCOUNTER — COMMUNICATION - HEALTHEAST (OUTPATIENT)
Dept: NURSING | Facility: CLINIC | Age: 19
End: 2019-07-10

## 2019-07-10 ENCOUNTER — COMMUNICATION - HEALTHEAST (OUTPATIENT)
Dept: HOME HEALTH SERVICES | Facility: HOME HEALTH | Age: 19
End: 2019-07-10

## 2019-07-11 ENCOUNTER — COMMUNICATION - HEALTHEAST (OUTPATIENT)
Dept: CARE COORDINATION | Facility: CLINIC | Age: 19
End: 2019-07-11

## 2019-07-11 ENCOUNTER — RECORDS - HEALTHEAST (OUTPATIENT)
Dept: ADMINISTRATIVE | Facility: OTHER | Age: 19
End: 2019-07-11

## 2019-07-11 ENCOUNTER — HOME CARE/HOSPICE - HEALTHEAST (OUTPATIENT)
Dept: HOME HEALTH SERVICES | Facility: HOME HEALTH | Age: 19
End: 2019-07-11

## 2019-07-12 ENCOUNTER — HOME CARE/HOSPICE - HEALTHEAST (OUTPATIENT)
Dept: HOME HEALTH SERVICES | Facility: HOME HEALTH | Age: 19
End: 2019-07-12

## 2019-07-17 ENCOUNTER — COMMUNICATION - HEALTHEAST (OUTPATIENT)
Dept: CARE COORDINATION | Facility: CLINIC | Age: 19
End: 2019-07-17

## 2019-07-17 ENCOUNTER — COMMUNICATION - HEALTHEAST (OUTPATIENT)
Dept: NURSING | Facility: CLINIC | Age: 19
End: 2019-07-17

## 2019-07-17 ENCOUNTER — AMBULATORY - HEALTHEAST (OUTPATIENT)
Dept: NURSING | Facility: CLINIC | Age: 19
End: 2019-07-17

## 2019-07-18 ENCOUNTER — HOME CARE/HOSPICE - HEALTHEAST (OUTPATIENT)
Dept: HOME HEALTH SERVICES | Facility: HOME HEALTH | Age: 19
End: 2019-07-18

## 2019-07-19 ENCOUNTER — HOME CARE/HOSPICE - HEALTHEAST (OUTPATIENT)
Dept: HOME HEALTH SERVICES | Facility: HOME HEALTH | Age: 19
End: 2019-07-19

## 2019-07-23 ENCOUNTER — COMMUNICATION - HEALTHEAST (OUTPATIENT)
Dept: NURSING | Facility: CLINIC | Age: 19
End: 2019-07-23

## 2019-07-25 ENCOUNTER — HOME CARE/HOSPICE - HEALTHEAST (OUTPATIENT)
Dept: HOME HEALTH SERVICES | Facility: HOME HEALTH | Age: 19
End: 2019-07-25

## 2019-07-25 ENCOUNTER — AMBULATORY - HEALTHEAST (OUTPATIENT)
Dept: FAMILY MEDICINE | Facility: CLINIC | Age: 19
End: 2019-07-25

## 2019-07-25 ENCOUNTER — COMMUNICATION - HEALTHEAST (OUTPATIENT)
Dept: FAMILY MEDICINE | Facility: CLINIC | Age: 19
End: 2019-07-25

## 2019-07-25 DIAGNOSIS — S06.9X3A TRAUMATIC BRAIN INJURY, WITH LOSS OF CONSCIOUSNESS OF 1 HOUR TO 5 HOURS 59 MINUTES, INITIAL ENCOUNTER (H): ICD-10-CM

## 2019-07-25 DIAGNOSIS — F29 PSYCHOSIS, UNSPECIFIED PSYCHOSIS TYPE (H): ICD-10-CM

## 2019-07-26 ENCOUNTER — HOME CARE/HOSPICE - HEALTHEAST (OUTPATIENT)
Dept: HOME HEALTH SERVICES | Facility: HOME HEALTH | Age: 19
End: 2019-07-26

## 2019-07-29 ENCOUNTER — COMMUNICATION - HEALTHEAST (OUTPATIENT)
Dept: FAMILY MEDICINE | Facility: CLINIC | Age: 19
End: 2019-07-29

## 2019-07-30 ENCOUNTER — AMBULATORY - HEALTHEAST (OUTPATIENT)
Dept: BEHAVIORAL HEALTH | Facility: CLINIC | Age: 19
End: 2019-07-30

## 2019-07-30 ENCOUNTER — COMMUNICATION - HEALTHEAST (OUTPATIENT)
Dept: NURSING | Facility: CLINIC | Age: 19
End: 2019-07-30

## 2019-07-30 DIAGNOSIS — S06.9X0D TRAUMATIC BRAIN INJURY, WITHOUT LOSS OF CONSCIOUSNESS, SUBSEQUENT ENCOUNTER: ICD-10-CM

## 2019-07-30 RX ORDER — OLANZAPINE 15 MG/1
15 TABLET ORAL AT BEDTIME
Qty: 30 TABLET | Refills: 6 | Status: SHIPPED
Start: 2019-07-30

## 2019-08-01 ENCOUNTER — HOME CARE/HOSPICE - HEALTHEAST (OUTPATIENT)
Dept: HOME HEALTH SERVICES | Facility: HOME HEALTH | Age: 19
End: 2019-08-01

## 2019-08-04 ENCOUNTER — HOME CARE/HOSPICE - HEALTHEAST (OUTPATIENT)
Dept: HOME HEALTH SERVICES | Facility: HOME HEALTH | Age: 19
End: 2019-08-04

## 2019-08-05 ENCOUNTER — HOME CARE/HOSPICE - HEALTHEAST (OUTPATIENT)
Dept: HOME HEALTH SERVICES | Facility: HOME HEALTH | Age: 19
End: 2019-08-05

## 2019-08-06 ENCOUNTER — OFFICE VISIT - HEALTHEAST (OUTPATIENT)
Dept: FAMILY MEDICINE | Facility: CLINIC | Age: 19
End: 2019-08-06

## 2019-08-08 ENCOUNTER — COMMUNICATION - HEALTHEAST (OUTPATIENT)
Dept: NURSING | Facility: CLINIC | Age: 19
End: 2019-08-08

## 2019-08-08 ENCOUNTER — COMMUNICATION - HEALTHEAST (OUTPATIENT)
Dept: FAMILY MEDICINE | Facility: CLINIC | Age: 19
End: 2019-08-08

## 2019-08-08 ENCOUNTER — HOME CARE/HOSPICE - HEALTHEAST (OUTPATIENT)
Dept: HOME HEALTH SERVICES | Facility: HOME HEALTH | Age: 19
End: 2019-08-08

## 2019-08-08 DIAGNOSIS — F29 PSYCHOSIS, UNSPECIFIED PSYCHOSIS TYPE (H): ICD-10-CM

## 2019-08-08 DIAGNOSIS — S06.9X3A TRAUMATIC BRAIN INJURY, WITH LOSS OF CONSCIOUSNESS OF 1 HOUR TO 5 HOURS 59 MINUTES, INITIAL ENCOUNTER (H): ICD-10-CM

## 2019-08-08 RX ORDER — OLANZAPINE 20 MG/1
TABLET ORAL
Status: SHIPPED | COMMUNITY
Start: 2019-07-02

## 2019-08-08 RX ORDER — SYRINGE WITH NEEDLE, 1 ML 25GX5/8"
SYRINGE, EMPTY DISPOSABLE MISCELLANEOUS
Refills: 2 | Status: SHIPPED | COMMUNITY
Start: 2019-07-24

## 2019-08-08 RX ORDER — HYDROXYZINE HYDROCHLORIDE 25 MG/1
TABLET, FILM COATED ORAL
Status: SHIPPED | COMMUNITY
Start: 2019-07-10

## 2019-08-08 RX ORDER — PROPRANOLOL HYDROCHLORIDE 20 MG/1
20 TABLET ORAL 3 TIMES DAILY PRN
Status: SHIPPED | COMMUNITY
Start: 2019-07-02

## 2019-08-08 RX ORDER — HALOPERIDOL DECANOATE 50 MG/ML
150 INJECTION INTRAMUSCULAR
Qty: 1 ML | Refills: 2 | Status: SHIPPED | OUTPATIENT
Start: 2019-08-08

## 2019-08-08 RX ORDER — AMOXICILLIN 250 MG
CAPSULE ORAL
Status: SHIPPED | COMMUNITY
Start: 2019-07-10

## 2019-08-15 ENCOUNTER — COMMUNICATION - HEALTHEAST (OUTPATIENT)
Dept: ADMINISTRATIVE | Facility: CLINIC | Age: 19
End: 2019-08-15

## 2019-08-23 ENCOUNTER — COMMUNICATION - HEALTHEAST (OUTPATIENT)
Dept: FAMILY MEDICINE | Facility: CLINIC | Age: 19
End: 2019-08-23

## 2019-08-26 ENCOUNTER — COMMUNICATION - HEALTHEAST (OUTPATIENT)
Dept: NURSING | Facility: CLINIC | Age: 19
End: 2019-08-26

## 2019-09-11 ENCOUNTER — COMMUNICATION - HEALTHEAST (OUTPATIENT)
Dept: CARE COORDINATION | Facility: CLINIC | Age: 19
End: 2019-09-11

## 2019-09-30 ENCOUNTER — COMMUNICATION - HEALTHEAST (OUTPATIENT)
Dept: NURSING | Facility: CLINIC | Age: 19
End: 2019-09-30

## 2019-10-23 ENCOUNTER — COMMUNICATION - HEALTHEAST (OUTPATIENT)
Dept: NURSING | Facility: CLINIC | Age: 19
End: 2019-10-23

## 2019-10-24 ENCOUNTER — COMMUNICATION - HEALTHEAST (OUTPATIENT)
Dept: NURSING | Facility: CLINIC | Age: 19
End: 2019-10-24

## 2019-11-05 ENCOUNTER — OFFICE VISIT - HEALTHEAST (OUTPATIENT)
Dept: FAMILY MEDICINE | Facility: CLINIC | Age: 19
End: 2019-11-05

## 2020-01-27 ENCOUNTER — RECORDS - HEALTHEAST (OUTPATIENT)
Dept: ADMINISTRATIVE | Facility: OTHER | Age: 20
End: 2020-01-27

## 2021-02-16 ENCOUNTER — COMMUNICATION - HEALTHEAST (OUTPATIENT)
Dept: FAMILY MEDICINE | Facility: CLINIC | Age: 21
End: 2021-02-16

## 2021-02-23 ENCOUNTER — RECORDS - HEALTHEAST (OUTPATIENT)
Dept: ADMINISTRATIVE | Facility: OTHER | Age: 21
End: 2021-02-23

## 2021-02-24 ENCOUNTER — COMMUNICATION - HEALTHEAST (OUTPATIENT)
Dept: FAMILY MEDICINE | Facility: CLINIC | Age: 21
End: 2021-02-24

## 2021-05-27 NOTE — TELEPHONE ENCOUNTER
The cousin of Cony Valdez, with whom he and his mom live, reports that she has to start working, to support her family. She has been supervising Cony Say, but once she starts working, there will be no one to do it. She already has the job secured and would like to start working [employer]. Cony Say's mom is not able --she is dealing with her own mental/emotional difficulties. The PCA does not start until mid-late April, and we do not know how much time he has qualified for.    He has not been scheduled with the brain injury clinic at [place], as far as I see-- not sure why this has not happened. Referral was late Jan.    I am thinking that for the long term, Cony Say needs to be in a group home or similar long term setting. While I think his outbursts can be better controlled, I cannot guarantee they will ever be completely controlled. His family is not able to provide the supervision he needs and the cousin has been very generous to help by providing housing and supervision so far.    Any ideas? I am writing this here, as a phone encounter, so the conversation thread is kept on the patient's chart.

## 2021-05-27 NOTE — PROGRESS NOTES
Patient is here for follow up and medication management.    PHQ-9=5, somewhat difficult  Mood Current= 5, very difficult  Depression:  3/5 no S/I  Anxiety:  3/5 worried about money  Appetite: yes  Sleep: having a hard time getting to sleep    Specific Concerns today: Patient is concerned about sleep and he is also constipated.      [place] :  No Activity    Correct pharmacy verified with patient and confirmed in snapshot? [x] yes []no    Charge captured ? [x] yes  [] no    Medications Phoned  to Pharmacy [] yes [x]no  Name of Pharmacist:  List Medications, including dose, quantity and instructions      Medication Prescriptions given to patient   [] yes  [x] no   List the name of the drug the prescription was written for.       Medications ordered this visit were e-scribed.  Verified by order class [x] yes  [] no    Medication changes or discontinuations were communicated to patient's pharmacy: [] yes  [x] no    UA collected [] yes  [x] no    [place] Prescription Monitoring Program Reviewed? [x] yes  [] no    Referrals were made to:  none  Future appointment was made: [x] yes  [] no    Dictation completed at time of chart check: [] yes  [x] no    I have checked the documentation for today s encounters and the above information has been reviewed and completed.

## 2021-05-27 NOTE — TELEPHONE ENCOUNTER
Great - I hope the DA has been done. When he was in the hospital, they should have had time to do it.    However, if things do not work with Dr. Welsh, I found out about a CNP with experience with [facility] - Ofelia Moore. I need psychiatry help with [facility] Say's meds, so if I cannot get the help from Dr Welsh, I'll be referring the patient to her. YANI

## 2021-05-27 NOTE — PROGRESS NOTES
OFFICE VISIT NOTE      Assessment & Plan   Cony Say is a 18 y.o. male who is s/p head injuries twice in 2018 and subsequent behavior difficulties and poor cognition.  He is ashamed and aware of his condition, but he does not know how to improve himself.   He says and his cousin confirms that he still has outbursts more than once daily, even with medication. I really need some psychiatry help for him. I set up an appointment in 5 days on 4/2 at 5pm with Dr. Welsh. I hope that will provide more help to me and to him not only for medical management, but also for help to find him a supportive environment in which to live.    His current living arrangement, with his mother in his cousin's home is safe for him, but the cousin --who has been supervising and helping with meds - is resuming work (due to financial needs). Without her there, Cony Say will be unsupervised generally and not helped with meds (his mother has her own severe psychological problems going on and she is not in a position to provide reliable help). This is a dangerous situation. At this time, I am strongly considering a re-admission to [facility]. There is literally no other place for him to go.    Meawhile, he'll increase his depakote to 1500/day.  Use propranolol 20mg 2 tabs with onset of agry outburst.  Continue olanzipine at night.      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 male.     1) fine - no changes, he says.  He and his cousin then say:  Last outburst - yesterday - because he was mad at himself and at his parents  My mom gave me the med, it helped. It took a while to help.   Outbursts come daily, usually a few times each day    2) how is he feeling about himself -   Feels bad, useless  Does he have hope? Yes    3) citizenship waiver - must be typed and returned with paperwork from [facility] proving he gets food support (in order to get the fee waived)  4) his cousin (renter of the home [facility] Say lives in) is about to resume working outside the home. She therefore cannot continue to supervise him. His mother is not mentally or emotionally able to supervise him, either. It is a difficult situation, the cousin says, but financially she has to work. It has been very hard on her to go to many appointments with him. They keep waiting for him to get a PCA but it is a long wait. She even put her request for public housing on hold because of this situation. She wants to help but she does not want to lose housing.    Current Outpatient Medications   Medication Sig     divalproex (DEPAKOTE ER) 500 MG 24 hour tablet Take 3 tablets (1,500 mg total) by mouth at bedtime.     hydrOXYzine HCl (ATARAX) 25 MG tablet Take 1 tablet (25 mg total) by mouth 2 (two) times a day as needed for itching or anxiety. Or use for high anxiety     multivitamin with minerals tablet Take 1 tablet by mouth daily.     OLANZapine (ZYPREXA) 15 MG tablet Take 2 tablets (30 mg total) by mouth at bedtime.     propranolol (INDERAL) 20 MG tablet Take 1 tablet (20 mg total) by mouth 3 (three) times a day. Take an additional pill if you feel very nervous or angry     senna-docusate (PERICOLACE) 8.6-50 mg tablet Take 2 tablets by mouth daily as needed for constipation.     traZODone (DESYREL) 50 MG tablet Take 1 tablet  (50 mg total) by mouth at bedtime as needed, may repeat once for sleep.       PSFHx: appropriate sections of PMH completed/filled in   Tobacco Status:  He  reports that he quit smoking about 2 months ago. He smoked 1.00 pack per day. he has never used smokeless tobacco.    Review of Systems:  No fever.  No diarrhea. All other systems negative except as noted above.    Objective:    /72 (Patient Site: Left Arm, Patient Position: Sitting, Cuff Size: Adult Regular)   Pulse (!) 106   Temp 99  F (37.2  C) (Oral)   Resp 20   Ht 5' (1.524 m)   Wt 113 lb 8 oz (51.5 kg)   SpO2 97%   BMI 22.17 kg/m    GENERAL: No acute distress.  Mood: low, calm  Insight: poor  Judgment: poor to fair  Affect: flat    Skin: clean, not dry  Gait: even for the shot time I observed    Spent 40 min face to face with patient with more the 50% spent in counseling, reviewing chart, and coordination of care and discussing mood, outbursts, meds, living situation, supervision needed.

## 2021-05-27 NOTE — TELEPHONE ENCOUNTER
Please call [facility] Say, but talk to his cousin (Day Mu) with whom he lives. Let her know he has an appointment to see the psychiatrist he saw when in the hospital. The appointment is Tuesday, April 2nd at 5pm. Dr. Church arranged this appointment to help more with medications and hopefully decreasing the angry outbursts.  Ana Ortega knows to schedule transportation for the appointment.   I hope his cousin or mom can accompany him to this appointment.

## 2021-05-27 NOTE — TELEPHONE ENCOUNTER
Social Work Care Coordination Note:    SUNDAR received message from [facility] that [place] choices  for pt is L[facility] Brizuela and it is unclear in the system if assessment has been completed.  SUNDAR left message for [facility] Brizuela at [phone].
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
## 2021-05-27 NOTE — PROGRESS NOTES
Social Work Care Coordination Note:    SUNDAR received call from Nataly at [facility] indicating she had met with pt and pts mother to complete ARMHS intake and diagnostic assessment.   Nataly believes that pt is not a good candidate for ARM due to his lack of engagement.  SUNDAR discussed a referral for MH case management and Nataly agrees.  She will send [facility] SW completed DA to include in referral.  SW and PCP have been consulting on pts needs and PCP agreeable to case management referral.

## 2021-05-27 NOTE — TELEPHONE ENCOUNTER
New Appointment Needed  What is the reason for the visit:    Same Date/Next Day Appt Request  What is the reason for your visit?: Medical statement for PCA Service  Provider Preference: PCP only  How soon do you need to be seen?: tomorrow  Waitlist offered?: No  Okay to leave a detailed message:  Yes  You may have to call the number 2-3 times for it to go through per Day Mu.    ---Sasha language preference

## 2021-05-27 NOTE — TELEPHONE ENCOUNTER
Please have  or specialty  call patient to discuss directions to psychiatrist office as they state to CMT that they do not know where to go.

## 2021-05-27 NOTE — TELEPHONE ENCOUNTER
Pt's PCP called to express concerns regarding patient's current behaviors. She states that patient has been having very frequent anger outbursts and was hoping he could see Dr. Welsh sooner than his 4/30/19 appointment. Pt is now scheduled to come in on 4/2/19.

## 2021-05-27 NOTE — TELEPHONE ENCOUNTER
Dory Soto notified per MD note dated 03/27/19 that she or the patient's mother should accompany the patient to the psychiatrist appointment in April.     LYDIA asked the caller about the concern for forms and what form needs to be complete. The speaker is not able to explain what the form is or for or where it came from. She states that the form asks about mental health problems.     LYDIA was not able to figure out what the form is for or who needs to see. LYDIA asked the speaker to bring to the clinic for review or discuss with psychiatrist and see what we can figure out. She agrees.

## 2021-05-27 NOTE — TELEPHONE ENCOUNTER
Patient cousin notified per provider note below. The patient verbalizes understanding of provider/CSS instructions for follow-up and continued care per provider message.

## 2021-05-27 NOTE — TELEPHONE ENCOUNTER
Spoke w/ Day Mu via gonzalo Hsu, told her that pt's psychiatry appts are in [facility]. She said she knows exactly where that's at. Informed her of appts:    Apr 02, 2019  5:00 PM CDT  (Arrive by 4:45 PM)  Office Visit with Edith Welsh MD  [facility] ([facility]) [address] [address]  [phone]     And F/U on 4/30 as well. They have their own transportation.

## 2021-05-27 NOTE — PROGRESS NOTES
Disciplines Present: Marcio, Mechelle, Sonia, Albino, and Mallorie.     The patient was discussed during weekly Care Conference Huddle today.   Goals and barriers were discussed and a plan was established.     Goals        Patient Stated      I want to know if I qualify for an ARMHS worker. (pt-stated)      Action steps to achieve this goal  1.  I will answer my phone when [facility] calls me to schedule my intake assessment. (It is scheduled per Riana, next week, Riana not at her desk and is unsure about exact day of the appointment)  2.  I will complete my ARM intake assessment next week.  3.  I will provide my ARMHS workers name and phone number to my Care Guide IF I am assigned an ARMHS worker.    Date goal set:  3/4/2019    Discussed: 3/20/2019        I will complete [facility] intake to get connected to  in the next 2 months (pt-stated)      Action steps to achieve this goal    1.  I will answer my phone when [facility] calls me as my Care Guide did leave a message for [facility] today and informed them that I would like to re-open my closed case.  2.  I will talk to my PCP at my next appointment on 3/27 about what more I may need from her regarding my citizenship process.  3.  I will update my care guide at outreach calls (Continuous)      Date goal set:  2/13/19- AJ    Discussed: 3/20/19        I would like a [facility] Assessment completed for PCA services and waiver services for my ADLs. (pt-stated)      Action steps to achieve this goal  1.  I will speak with the [facility] at outreach telephone calls. (Continuous)  2.  I will speak a Sasha speaking PCA agency and ask to assist me with PCA assessment. (Still waiting on call from [facility] to schedule assessment)  3.  I will provide any necessary paperwork/documentation in a timely manner if needed to assist with this process. (continuous)        Date goal set:  1/21/19    Discussed: 2/12/19; 3/20/2019              Barriers:non-compliance, TBI, mental  health    Action Plan if indicated:   1) SW will referral patient to mental health  ( [facility])   2) SW will try to contact [facility] to find out about PCA hours - what's the hold up.     Plan/Follow up needed: see below

## 2021-05-27 NOTE — TELEPHONE ENCOUNTER
Called Day Mu but cell did not have any VM.  Left message on home phone to call.  Will call back tomorrow.  Thanks.

## 2021-05-28 NOTE — TELEPHONE ENCOUNTER
Care Guide was called into Room 2 where CVT was meeting with patient's mother Pauline Horvath. CCC RN not in the clinic today.    Pauline Horvath brought in two new medications that were prescribed by the mental health provider for Cony Say and Pauline Horvath was not sure how to give them to Cony Say.    CVT provider is interested in knowing if Cony Say could have referral sent to [facility] for medication management so as new medications are started or discontinued he can receive the support he needs with managing his medications.    Is PCP able to send a referral for home care foe Cony Say to have medication management in the home?

## 2021-05-28 NOTE — TELEPHONE ENCOUNTER
Requesting new verbal orders for Skilled Nursing start of care assessment for 5/5/19 per patient's request to be moved rather then vended to another agency.     New verbal order needed as 5/5/19 is outside the original 48 hour order.

## 2021-05-28 NOTE — PROGRESS NOTES
Interp: [facility] [id number]    Day Mu reports that she will answer her phone when home care calls to schedule medication set up appointment.    Day Mu states she is Bweh Say's PCA and he receives 4 hours and 15 minutes of PCA per day and she is his PCA.    Day Mu has Care Guides phone number and will call with any new concerns.      Next Outreach: 6/5/19
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
## 2021-05-28 NOTE — PROGRESS NOTES
Correct pharmacy verified with patient and confirmed in snapshot? [x] yes []no    Charge captured ? [x] yes  [] no    Medications Phoned  to Pharmacy [] yes [x]no  Name of Pharmacist:  List Medications, including dose, quantity and instructions      Medication Prescriptions given to patient   [] yes  [x] no   List the name of the drug the prescription was written for.       Medications ordered this visit were e-scribed.  Verified by order class [] yes  [x] no    Medication changes or discontinuations were communicated to patient's pharmacy: [] yes  [x] no    UA collected [] yes  [x] no    [place] Prescription Monitoring Program Reviewed? [] yes  [x] no    Referrals were made to:  Neuropsychological testing     Future appointment was made: [] yes  [x] no   Consult only for Dr. Welsh   Dictation completed at time of chart check: [x] yes  [] no    I have checked the documentation for today s encounters and the above information has been reviewed and completed.
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
## 2021-05-28 NOTE — PROGRESS NOTES
"OFFICE VISIT NOTE      Assessment & Plan   [facility] Say is a 19 y.o. male who has traumatic brain injury (5 and 8/2018) causing a big behavior change, now seems reasonably controlled with medications and a PCA. It was difficult today to tell how much he was tracking with our conversation, but his interactions were appropriate, his speech was not pressured, he seemed much more comfortable - eyes were not darting around, and he spoke about somethings like he really knew - he stated his balance is a bit improved.     Await brain injury clinic appointment. Then do [place] citizenship waiver. Then consider if he can get into any \"adult day care\" type program.    Diagnoses and all orders for this visit:    Immunization due  -     Tdap vaccine,  6yo or older,  IM  -     HPV vaccine 9 valent 2 dose IM (if started before age 15)    Psychosis, unspecified psychosis type (H)    Violent behavior    Poor memory    Poor balance    Traumatic brain injury, with loss of consciousness of 1 hour to 5 hours 59 minutes, initial encounter (H)    Current moderate episode of major depressive disorder, unspecified whether recurrent (H)    Panic disorder without agoraphobia        Gena Church MD    The following are part of a depression follow up plan for the patient:  coping support assessment and coping support management          Subjective:   Chief Complaint:  Depression and Medication Check    19 y.o. male.     1) getting meds  Day Charles (his cousin) is PCA for him and for his mom  Goes to bed 8-10pm, wakes at 9am  Walks -  Yes, sometimes on his own, gets lost    Has a girlfriend in [place]. PCA says she's 50 years old.    2) hydroxyzine - gives prn, about 2 times per week when he gets panicky    3) PCA time is only some times - she notes that it is challenging to keep track of [facility] Say - he wanders and wants to walk a lot, but she cannot always do it (go with him). The number of hours he needs is not what he gets, she thinks. She works hard to " help him.    4) balance - improving now    5) [facility] worker - PCA thinks he has one, but she says that person cannot take walks with him    6) brain injury clinic at [facility] 5/23    Current Outpatient Medications   Medication Sig     divalproex (DEPAKOTE ER) 500 MG 24 hour tablet Take 3 tablets (1,500 mg total) by mouth at bedtime.     hydrOXYzine HCl (ATARAX) 25 MG tablet Take 1 tablet (25 mg total) by mouth 2 (two) times a day as needed for itching or anxiety. Or use for high anxiety     multivitamin with minerals tablet Take 1 tablet by mouth daily.     OLANZapine (ZYPREXA) 15 MG tablet Take 2 tablets (30 mg total) by mouth at bedtime.     propranolol (INDERAL) 20 MG tablet Take 1 tablet (20 mg total) by mouth 3 (three) times a day. Take an additional pill if you feel very nervous or angry     senna-docusate (PERICOLACE) 8.6-50 mg tablet Take 2 tablets by mouth daily as needed for constipation.     sertraline (ZOLOFT) 50 MG tablet Take 1 tablet (50 mg total) by mouth daily.     traZODone (DESYREL) 50 MG tablet Take 1 tablet (50 mg total) by mouth at bedtime as needed, may repeat once for sleep.       PSFHx: appropriate sections of PMH completed/filled in   Tobacco Status:  He  reports that he quit smoking about 4 months ago. He smoked 1.00 pack per day. He has never used smokeless tobacco.    Review of Systems:  No fever.  No rash. All other systems negative except as noted above.    Objective:    /66 (Patient Site: Left Arm, Patient Position: Sitting, Cuff Size: Adult Regular)   Pulse 86   Temp 98.1  F (36.7  C) (Oral)   Resp 16   Ht 5' (1.524 m)   Wt 121 lb (54.9 kg)   SpO2 98%   BMI 23.63 kg/m    GENERAL: No acute distress.  Mood: fair  Insight: fair  Judgment: fair  Affect: flat    Ht: reg s1s2  Lungs: clear    Reviewed all meds    Spent 40 min face to face with patient with more the 50% spent in counseling, reviewing chart, and coordination of care and discussing anxiety/panic, meals, sleep, brain  injury appointment, citizenship.

## 2021-05-28 NOTE — PROGRESS NOTES
Interp: Shadi [id number]    Attempt 1: Care Guide called patient but was unable to leave a message.  If this patient is returning my call, please transfer to Mechelle Grant at ext [phone].      Next Outreach: 5/3/19     - Were you approved for PCA hours?   - Is any family member able to help Cony Say with his citizenship process as [facility] was unable to reach anyone to start the process of helping him.   - CCC SW working on finding out if Cony Say can be approved for a

## 2021-05-28 NOTE — PROGRESS NOTES
Care Guide was called into Room 2 where CVT was meeting with patient's mother Pauline Horvath. CCC RN not in the clinic today.     Pauline Horvath brought in two new medications that were prescribed by the mental health provider for Cony Say and Pauline Horvath was not sure how to give them to Cony Say.     CVT provider is interested in knowing if Cony Say could have referral sent to [facility] for medication management so as new medications are started or discontinued he can receive the support he needs with managing his medications.     Is PCP able to send a referral for home care foe Cony Say to have medication management in the home?

## 2021-05-28 NOTE — TELEPHONE ENCOUNTER
Please take a verbal order for what is stated below. The patient wants more help with his balance.

## 2021-05-28 NOTE — TELEPHONE ENCOUNTER
Order as requested below APPROVED per [facility] clinical protocol for approving home nursing requests. Staff CMT routing response to requester and MD.

## 2021-05-28 NOTE — PROGRESS NOTES
Pt is here for psychiatric evaluation for his TBI; referral from Dr. Welsh to see if patient could benefit from clinical services/resources from the TBI clinic at [place].    Sasha wheeler services provided by: [facility]        Family is present today Cousin Day Charles who pt lives with.     Mood: has not been good; at times has increased anger  Sleep: pretty good  Appetite: Normal    At times admits to audio and visual hallucinations at times   Denies SI/HI thoughts at this time.

## 2021-05-28 NOTE — TELEPHONE ENCOUNTER
PT HE is asking for verbal ok for PT: 2w2 to work on strengthening, gait/transfer training, and balance training.  You can respond to this inbasket with the ok or call and leave a message at [phone].  Thank you

## 2021-05-28 NOTE — TELEPHONE ENCOUNTER
Request for Orders  HE home care received a referral for this patient to start home care services for SN to begin within 48 hours of order date. However, the patient was not home for his scheduled appointment this morning.  We spoke to family to reschedule and SN SOC is now on 5.8.2019.    Who s Requesting: Sil      Orders being requested:   for SOC 5.8.2019    Where to send Orders: Simply respond to this Epic Telephone Call message string, and we can take as a verbal order if appropriate.   Thank you.

## 2021-05-28 NOTE — PROGRESS NOTES
Patient is here for follow up and medication management.    PHQ-9=3, somewhat difficult  Mood Current=1, somewhat difficult  Depression: some depression no S/I  Anxiety:  no  Appetite: normal   Sleep: normal    Specific Concerns today: Recheck after consult with Dr. Martinez    Correct pharmacy verified with patient and confirmed in snapshot? [x] yes []no    Charge captured ? [x] yes  [] no    Medications Phoned  to Pharmacy [] yes [x]no  Name of Pharmacist:  List Medications, including dose, quantity and instructions      Medication Prescriptions given to patient   [] yes  [x] no   List the name of the drug the prescription was written for.       Medications ordered this visit were e-scribed.  Verified by order class [x] yes  [] no    Medication changes or discontinuations were communicated to patient's pharmacy: [x] yes  [] no    UA collected [] yes  [x] no    [place] Prescription Monitoring Program Reviewed? [] yes  [x] no    Referrals were made to:  NONE  Future appointment was made: [x] yes  [] no    Dictation completed at time of chart check: [] yes  [x] no    I have checked the documentation for today s encounters and the above information has been reviewed and completed.

## 2021-05-28 NOTE — TELEPHONE ENCOUNTER
Please take a verbal order for skilled nursing and OT as you state. His rehab-ability is uncertain so you might need more than initially though. He has an appointment 5/23 at the [facility] brain injury clinic which I hope will be helpful in getting an idea of his prognosis.  I am thankful to have found him willing to trust me by moving slowly, with lots of explanation and with compassion. I hope you find the same, as his needs are great.

## 2021-05-28 NOTE — PROGRESS NOTES
CCC SUNDAR completed and faxed case management referral to [facility] Case Management.    Care guide delegation:  Please follow up with pts family member at next outreach to determine if MH  intake complete     Goals        Patient Stated      I will complete [facility] intake to get connected to  in the next 2 months (pt-stated)      Action steps to achieve this goal    1.  Per Day Mu, they completed paperwork for citizenship but still need the medical waiver.  2.  Per Day Mu, they will talk to Dr. Church on 5/10 about medical waiver.  3.  I will update my care guide at outreach calls (Continuous)      Date goal set:  2/13/19- AJ    Discussed: 3/20/19; 5/3/19        I would like to see if I qualifty for a   in the next 2 months (pt-stated)      Action steps to achieve this goal  1.  CCC SUNDAR faxed referral to [facility] case management intake (5/6/19)  2.  Care guide will discuss progress or barriers during outreach goals and if approved add case manger to my care team    Date goal set:  4/4/19

## 2021-05-28 NOTE — TELEPHONE ENCOUNTER
Hi Dr. Church,    I saw [facility] Say today to admit him to [facility]. I am requesting orders for skilled nurse visit 1x/week x4, then decrease to 1xEOW with 3 prn visits for complex medication management. Order for PT/OT to eval and treat. Patient/CG reported poor memory and poor balance.    Please respond with orders    Thanks,    Stefani Mcdowell
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
## 2021-05-29 NOTE — TELEPHONE ENCOUNTER
Per [facility] patient's mother requested medical transportation to the [facility] appointment to start SMRT paperwork.      Tuesday 6/25/19 at 10am    [employer] Conf#: [id number]    [facility] [employer]
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
## 2021-05-29 NOTE — TELEPHONE ENCOUNTER
Refill Request  Did you contact pharmacy: Request received from patient's pharmacy  Medication name:   Requested Prescriptions     Pending Prescriptions Disp Refills     sertraline (ZOLOFT) 50 MG tablet 30 tablet 1     Sig: Take 1 tablet (50 mg total) by mouth daily.     Who prescribed the medication: Gena Church MD   Pharmacy Name and Location: [facility] [place] ([facility])  Is patient out of medication: Information not provided  Patient notified refills processed in 72 hours:  N/a  Okay to leave a detailed message: no
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
## 2021-05-29 NOTE — PROGRESS NOTES
Interp: Lesly [id number]    CHW collaborated with the [facility] SUNDAR about patient's [facility] CM referral and [facility] SUNDAR delegated to the CHW to contact patient's PCA Day Mu to schedule a CCC SW appointment to follow up on the  referral as [facility] SUNDAR states that the [facility] worker will be flexible and complete the assessment at the clinic and is flexible on day and time of appointment.      [facility] SW Appointment Tuesday 6/18 at 1pm      CHW provided the [facility] Mobile Crisis phone number ([phone]) to Day Mu and educated her about the importantance of calling this phone number if Bweh Say is displaying any inappropriate or concerning behavior.      CHW contacted Sandra Greenfield (Direct Ph: [phone]), the [facility] Case Manage intake assessor and informed her of when the [facility] SUNDAR appointment is scheduled for and she states she will be able to make this appointment. CHW provided a good call back number where to reach the CHW if needed and confirmed with Sandra that she has the correct phone number now for Day Mu.      Next Outreach: 6/18/19     - Reminder today Bwsamir Say has his 1pm CCC SW appointment, and it is very important he keep this appointment so that he can be evaluated for a MH

## 2021-05-29 NOTE — PROGRESS NOTES
Interp: Rin [id number]      Attempt 1: CHW called patient and [phone] is not working and [phone] went straight to  and CHW left a message.  If this patient is returning my call, please transfer to Mechelle Grant at ext [phone].      Next Outreach: 5/24/19     - Did he attend his 5/23/19 appt?

## 2021-05-29 NOTE — TELEPHONE ENCOUNTER
Covering provider, Dr. Poe, gives verbal orders for the following HC orders:    OT  1 visit(s) every 1 week(s) for 1 week(s)  2 visit(s) every 1 week(s) for 4 week(s) for cognitive assessment, caregiver ed, safety and indep with ADL/IADL tasks. Thanks.         YANI for PCP. Thanks.
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
## 2021-05-29 NOTE — PROGRESS NOTES
Programs Applying For: SMRT  County:  Case #:  [facility] Worker:   [place] #:   PMI #:   Tracking:   Date Applied:     Outreach:   6/25/2019: FRG called patient's cousin to gather demographic information for applications.  left , G will make outreach call in 1 week.   6/25/2019: FRG met with patient and his cousin at [facility] and completed the Certain population application and 6696A. Patient's cousin is going to bring in the green card information for patient to [facility] today. FRG will call patient's cousin later today to get demographic information of the 6 individuals in the home and then send applications to [facility].   6/18/2019: FRG called patient to discuss referral and set up appointment to complete 6696A and Certain population application . Appointment scheduled for 6/25 @ 10:00 am, outlook invitation sent to .       Health Insurance Information:       Referral:   Hi,     [facility] SUNDAR Nobles would like this patient to start the SMRT process.       ThanksMechelle

## 2021-05-29 NOTE — TELEPHONE ENCOUNTER
Order as requested below APPROVED per [facility] clinical protocol for approving home nursing requests. Staff CMT routing response to requester and MD.

## 2021-05-29 NOTE — TELEPHONE ENCOUNTER
I totally agree he should be in a group home environment. I tried pursuing that previously but it did not work. Perhaps now, with these problems, it can happen.  [facility] Say was supposed to go to the [facility] Brain injury clinic yesterday. Did he? PCP is eager for that information.    Please call the brain injury clinic for whatever info they can provide from his appointment (needed for his N648)

## 2021-05-29 NOTE — TELEPHONE ENCOUNTER
"In \"orders only\" you can order haldol.  Select the \"facility list\" tab, and clinic administered meds is a category.  (It is not under the \"database\" tab.)  - dlh  "

## 2021-05-29 NOTE — PROGRESS NOTES
Disciplines Present: Albino, Marcio, Mechelle, Mallorie, and Sonia.    The patient was discussed during weekly Care Conference Huddle today.   Goals and barriers were discussed and a plan was established.     Goals        Patient Stated      I would like to be connected with a Mental Health  who can offer support/resources to me and my family ASAP (pt-stated)      Action steps to achieve this goal  1.  I attended appt with [facility] SW and my family members on 6/18/19 to meet with Sandra Greenfield, an  for [facility] Services.  2.  My mother and/or cousin will answer the phone when assigned case management agency calls to schedule a care planning appt.  3.  My mother and/or cousin will provide updates to [facility] CG at outreach and express any new questions/concerns as they arise. (Continuous)    Date goal set:  4/4/19  Discussed/updated: 5/23/19; 6/4/19; 6/18/19        I would like to know if I am eligible for a medical waiver ASAP (pt-stated)      Action steps to achieve this goal  1.  Per Day Mu, paperwork for citizenship has been completed, but medical waiver is still needed.  2.  I will attend my [facility] appointment on 7/24/19 at 1pm.  3.  My mother and/or cousin will provide updates to [facility] CHW at outreach and express any new questions/concerns as they arise. (Continuous)      Date goal set:  2/13/19   Discussed/updated: 3/20/19; 5/3/19; 5/23/19; 6/18/19              Barriers:    Action Plan if indicated:   1) [facility] SW will call CPS and [place] today  2) CHW will continue to support patient and caregiver with transportation, remind them of appts.  3) scheduled to see FRG on 6/25/19 to assist with SMRT  4) TBI clinic appt on 7/24/19    Plan/Follow up needed:
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
## 2021-05-29 NOTE — PROGRESS NOTES
"Interp: Ioana [id number]    PCP reports in appointment on 5/10/19: \"Await brain injury clinic appointment. Then do N648 citizenship waiver.\"    Day Mu reports she believes that medical transportation is picking [facility] Say up for his appointment in [place] as she states she requested medical transportation because the appointment is located in [facility]. CHW encouraged Day Charles to call the CHW back if no transportation shows up for this appointment. She states she will call.        Next Outreach: 6/6/19     - Was [facility] appointment attended? Was this the appt PCP wanted patient to attend before the medical waiver could be reviewed.          "

## 2021-05-29 NOTE — TELEPHONE ENCOUNTER
No phone number listed for patient. Medical system in [place] will NOT release medical information without signed KELTON.
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
## 2021-05-29 NOTE — TELEPHONE ENCOUNTER
This is a sad situation!  I tried to get him seen sooner, before there were legal problems. Hopefully with the recent troubles, he can get placed in a group home.  Without being at home, he will not be on his meds and will likely have even worse behavior, putting himself and others at risk of anything.    He needs to be in the [facility] mental health unit until this can be figured out.
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
## 2021-05-29 NOTE — TELEPHONE ENCOUNTER
Request for Orders    Who s Requesting: Home Care Occupational Therapist    Orders being requested: OT  1 visit(s) every 1 week(s) for 1 week(s) for reassessment (patient unavailable last week for visit to reassess for ongoing OT needs).     Where to send Orders: [facility], response through Epic preferred. Please call if you have questions.    Thank you!  Meghan Solomon, OTR/L  [phone]

## 2021-05-29 NOTE — TELEPHONE ENCOUNTER
RN cannot approve Refill Request    RN can NOT refill this medication not protocol approved for ages 21 and younger      Tj Spain, [facility] [place] Connection Triage/Med Refill 2/12/2019

## 2021-05-29 NOTE — TELEPHONE ENCOUNTER
LYDIA spoke with Meghan. She sent message to care team that travels to home. Home nurse will call to schedule Haldol injection.

## 2021-05-29 NOTE — PROGRESS NOTES
Interp: [id number]    CHW spoke with Dory Soto and provided a reminder about today's 1pm and informed her that it was recommended Ree Heh also attend this appointment as Ree is Bweh Say's mother. CHW informed Dory Soto that a [facility]  Sandra Greenfield will also be attending this appointment today at the clinic so that Bweh can be evaluated for a MH CM.      Next Outreach: 7/22/19

## 2021-05-29 NOTE — PROGRESS NOTES
Writer was requested by Dr. Church to call and remind patient and his cousin about patient's appt with [facility] tomorrow at 1pm.    Writer spoke to patient's mom and cousin, Dory Soto. Reminded them about patient's appt tomorrow at 1pm.     Dory Soto states she will attend the appt with patient and thanks writer for reminding her of the appt tomorrow.     LETI Min will also call patient to remind him of the appt tomorrow morning due to several no shows in the past.

## 2021-05-29 NOTE — PROGRESS NOTES
Disciplines Present: Albino, Marcio, Albino, Mallorie, and Sonia    The patient was discussed during weekly Care Conference Huddle today.   Goals and barriers were discussed and a plan was established.     Goals        Patient Stated      I want to find out if I qualify for a medical waiver. (pt-stated)      Action steps to achieve this goal  1.  Per Day Mu, they completed paperwork for citizenship but still need the medical waiver.  2.  Per Day Mu, PCP wants patient to wait until he has met with the [facility] before the medical waiver can be completed.  3.  I will update my CHW at outreach calls (Continuous)      Date goal set:  2/13/19- AJ    Discussed: 3/20/19; 5/3/19; 5/23/19        I would like to see if I qualifty for a MH  in the next 2 months (pt-stated)      Action steps to achieve this goal  1.  CCC SW faxed referral to [facility] case management intake (5/6/19)  2.  Per Day Mu, no one from [facility] has called yet but she states she will monitor her phone calls and talk with [facility] when they call her.    Date goal set:  4/4/19    Discussed: 5/23/19              Barriers: TBI, non compliance    Action Plan if indicated:     Plan/Follow up needed:   1) CG to give family the number of mobile crisis stabilized team   2) SW f/u on mental health  referral with [place]

## 2021-05-29 NOTE — TELEPHONE ENCOUNTER
Request for Orders    Who s Requesting: Home Care Occupational Therapist    Orders being requested: OT  1 visit(s) every 1 week(s) for 1 week(s)  2 visit(s) every 1 week(s) for 4 week(s) for cognitive assessment, caregiver ed, safety and indep with ADL/IADL tasks.    Where to send Orders: [facility], response through Epic preferred. Please call if you have questions.    Thank you!  Meghan Solomon, OTR/L  [phone]

## 2021-05-29 NOTE — TELEPHONE ENCOUNTER
PCP really needs medical records from [facility] in [place]. I really thought the patient signed an KELTON for us to get these records, but I cannot find that KELTON and records still have not come.  Please check for an KELTON so we can get his records and, if found, call [place] [phone].

## 2021-05-29 NOTE — TELEPHONE ENCOUNTER
Oh- that's right - we cannot order it within a tele-encounter! Thanks, DL.    Please call the patient or Day Moo, his PCA. Ask to set up a nurse-only appointment for next week to get the haloperidol injection. Since his behavior is so erratic, I think this is something worth a try. After the first injection, PCP will want to hear how he does. The dose can be changed for next month (this injection is given every 30 days).

## 2021-05-29 NOTE — TELEPHONE ENCOUNTER
CMT left detailed message for home care nurse to advise her per MD note below.     We need to schedule nurse-only to have patient receive first dose of Haldol decanoate in-clinic per MD order.

## 2021-05-29 NOTE — TELEPHONE ENCOUNTER
Sil - do you know how I can order Haloperidol decanoate for injection on this patient? I have a different patient who gets this at [facility] monthly, so I know it is possible. When I bring up haloperidol, it only comes up as an outpatient/home med, not as a med that can be given in the clinic.

## 2021-05-29 NOTE — PROGRESS NOTES
Assessment    SW contacted [facility] Welcome Desk (Direct Ph: [phone]) and confirmed that the MH CM referral sent by SUNDAR Barros on 5/6/19 was received by [facility]. As of 5/15/19, pt's case has been assigned to an  by the name of Sandra Greenfield. (Direct Ph: [phone])    SUNDAR additionally contacted Sandra at her direct number (see above). Sandra reported that she has attempted to contact pt via phone multiple times - there has been no answer. Sandra states that has not left a voicemail message, however, she mailed a letter to the pt's address on 5/22/19. Sandra has not heard anything from the pt or pt's family. If no response was received by 6/5/19, [facility] would close the referral.    SUNDAR and Sandra collaborated to formulate a plan moving forward. At this time, it would be best to get pt scheduled for an appointment with CCC SUNDAR in clinic and inform family that the intake assessment for mental health case management can be completed at that time. Sandra is more than willing to attend the visit here in clinic and states that the assessment takes 30-45 minutes. This assessment must be completed before any services can be established.      Action Plan:    will:  - Remain available PRN  - Follow up with home care SW if additional questions/concerns arise    Care Guide will:  - Contact pt to set up an appointment with CCC SUNDAR in clinic  - Inform Sandra Greenfield (Direct Ph: [phone]) of scheduled appointment and request her attendance  - Update SUNDAR once completed
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
## 2021-05-30 NOTE — TELEPHONE ENCOUNTER
Jr Church,    I just spoke to Day Mu ( PCA) via phone and she wasn't aware about the first injection. She did say that she noticed less mumbling or talking to himself the past week. States since yesterday, patient doesn't go out much - patient used to go out a lot several times a day looking for drugs, cigarettes, etc around the apartment but not as much today. States no drowsiness noted or not too sleepy.       Thanks.  Sonia Arrington RN

## 2021-05-30 NOTE — PROGRESS NOTES
Hospital discharge follow up call to pt. Sasha peaking person answering the phone states no one by pt's name is there.  RN will attempt call back at another time.

## 2021-05-30 NOTE — PROGRESS NOTES
Interp: Saw [id number]    CHW provided a reminder about today's FRG appointment which is at 10am to start the SMRT process today. CHW did inform them that medical transportation will pick them up for this appointment in about 30 minutes.       Next Outreach: 7/23/19     - Reminder tomorrow's 7/24/19 TBI appt

## 2021-05-30 NOTE — PROGRESS NOTES
Interp: Shadi [id number]      CHW spoke with Dory Soto and reminded her that [facility] Say has a very important TBI Clinic appointment tomorrow morning and medical transportation will arrive between 6:30am-7am for the 7:45am appointment. She understands that this appointment is very important as he needs to complete this appointment in order to continue working towards the goal of a TBI waiver and Group Home placement.      Date: 07/31/2019 (Wednesday) Time: 07:45am arrial (for paperwork)  Appointment time: 08:15am     Marty Morelos  [address] [place] [address]  [phone]  Dr. Benson Lantigua      Next Outreach: 8/27/19      Plan:     - Any update on progress towards TBI waiver and housing? - Talk with [facility] SUNDAR and  NATO   - Any update on SMRT process? - Talk with [facility] SUNDAR and MANJIT   - Any update on medical waiver for citizenship?

## 2021-05-30 NOTE — TELEPHONE ENCOUNTER
[facility] reports a ride was already scheduled to the [facility] [place] on 7/24/19 at 1pm.     Blue Ride Conf#: [id number]          [address] [address]    Thursday 7/25 10am    Care Conference appointment, Cony Valdez, Pauline Horvath and PCA Day Mu will attend this appointment along with the PCP, CCC RN, CCC SW, CHW, and possibly the HH RN, and  CM.    Blue Rid[employer]achita Conf#: [id number]

## 2021-05-30 NOTE — TELEPHONE ENCOUNTER
I called [facility] and spoke with Dr. Karen Abebe who was willing to see him but insurance said [facility] is out of network.    I called [facility] and spoke to Dr. Law. She thought Dr. Benson Chavarria might be able to see [facility] Say in [place] on short notice. [phone].    Dr. Chavarria was given my cell # and was asked to call me. I waited all afternoon and when I called back I was told he is on inpatient and cannot call me today. He might call tomorrow. I explained I see patients tomorrow and am not nearly as available. This is really too bad as it wasted the afternoon AND a chance to set something up.     I called [place] [facility] and was told they were closed and that I'd have to try them back tomorrow after 8:30am.    I tried [facility] and they, too, were closed (at 4:30). No back line was known.  Back line to [facility] [phone] had a busy signal repeatedly.

## 2021-05-30 NOTE — TELEPHONE ENCOUNTER
"Jr Church,     Stefani Dc, [facility] home care  for [facility] Say called me today stating that patient had Haldol IM injection on 6/25/19 and he received another one here at the clinic yesterday.       Stefani Dc, RN is patient's home care nurse . You can reach her anytime via in basket or email.     Please advice. I also \"cc\" Stefani with this message thread.     Thanks,   Sonia Arrington, RN  "

## 2021-05-30 NOTE — TELEPHONE ENCOUNTER
Friday 7/26 evening I received a call from Dr. Benson Chavarria of [facility] TBI clinic. HE IS WILLING TO SEE [facility] SAY this coming week - in [place] on Wednesday(7/31) or at the [facility] (if there are openings). The referral is re-ordered with specifics for [facility] Say to see Dr. Chavarria, so please set up this appointment AND  AND transportation.    If we get this TBI assessment done and it includes an assessment of his inability to make his own choices, then we can get him into a Group Home with more supervision.

## 2021-05-30 NOTE — TELEPHONE ENCOUNTER
Information as below relayed to Swathi at [facility]. She will forward message to nursing staff.

## 2021-05-30 NOTE — TELEPHONE ENCOUNTER
Super - if this med can help him to be safer(like it sounds like it is doing), that's GREAT.  We will plan to do another injection Aug 1st.

## 2021-05-30 NOTE — PROGRESS NOTES
Programs Applying For: SMRT  County:  Case #:  [facility] Worker:   [place] #:   PMI #:   Tracking:   Date Applied:     Outreach:   7/3/2019: FRG called cousin and got the relationships for family members, FRG faxed applications to [facility] and will check in 3 weeks to make sure SMRT/Certain population application is processing.   7/2/2019: FRG called patient's cousin to gather demographic information (relationships) for applications.  left MANJIT CLARK will make outreach call in 1 week.   CLOSED ENCOUNTER:  6/25/2019: FRG called patient's cousin to gather demographic information for applications.  left AMBER, MANJIT will make outreach call in 1 week.   6/25/2019: FRG met with patient and his cousin at [facility] and completed the Certain population application and 6696A. Patient's cousin is going to bring in the green card information for patient to [facility] today. FRG will call patient's cousin later today to get demographic information of the 6 individuals in the home and then send applications to [facility].   6/18/2019: FRG called patient to discuss referral and set up appointment to complete 6696A and Certain population application . Appointment scheduled for 6/25 @ 10:00 am, outlook invitation sent to .       Health Insurance Information:       Referral:   HiMADELYN would like this patient to start the SMRT process.       ThanksMechelle

## 2021-05-30 NOTE — PROGRESS NOTES
Clinic Care Coordination Contact     Situation: Pt was a no show for CCC SW visit.       Plan/Recommendations:   1.) Please reschedule at next outreach if needed.

## 2021-05-30 NOTE — TELEPHONE ENCOUNTER
1) Hmmm. Interesting not only that we could not find the documentation about the injection but also that Bweh Say and his PCA did not remember him getting an injection!  2) I'm not worried about him getting 100mg, as my plan was to give 100mg next time.  3) I would like someone to call and ask Day Mu (PCA) about his behavior. First - did it improve after 6/25 (first injection)? Second, how is it how (after the second injection)?

## 2021-05-30 NOTE — TELEPHONE ENCOUNTER
Patient was discharged from [facility] without orders to resume home care. Patient confirms that he would like to continue with home care services.     Requesting verbal order for Skilled Nursing resumption of care to be completed on 7/11/19 per patient's request.     Thank you

## 2021-05-30 NOTE — TELEPHONE ENCOUNTER
Care Conference for Cony Valdez has been scheduled.      Thursday 7/25/19 at 11am    [address] [address]    Conference Room B, 2nd Floor    Reserved from 11-12:30pm      Attendance:    Cony Valdez  Day Charles, PCA  Pauline Horvath, mother of Cony Valdez  Dr. Gena Church, PCP  Karla Huff,  , [facility]  Sonia Arrington, CCC CONCHITA Tineo, CCC SUNDAR Grant, [facility]  Stefani Mcdowell, [facility] RN

## 2021-05-30 NOTE — PROGRESS NOTES
Clinic Care Coordination Contact    Writer called and reminded patient's cousin, Dory Soto of patient's appt today with CCC SW at 11am and CCC RN appt at 12pm.     Day Charles states she forgot about the appt but she will come to the appt.      Dory Soto was appreciative of the call.

## 2021-05-30 NOTE — TELEPHONE ENCOUNTER
Medication Question or Clarification  Who is calling: Pharmacy: fax  What medication are you calling about? (include dose and sig) olanzapine 20 mg daily with 10 mg pill for total of 30 mg daily.   Who prescribed the medication?: Dr Church  What is your question/concern?: The fax states the dose has been reduced to 15 mg.  There is an order in chart for this dose that has not been sent as it is No Print.  Please advise.   Pharmacy: [facility]  Okay to leave a detailed message?: Yes  Site CMT - Please call the pharmacy to obtain any additional needed information.

## 2021-05-30 NOTE — TELEPHONE ENCOUNTER
Order as requested below APPROVED per [place] clinical protocol for approving home nursing requests. Staff CMT routing response to requester and MD.

## 2021-05-30 NOTE — TELEPHONE ENCOUNTER
Called [facility].  Pt was given med by [facility] provider upon dc on 7/9/19.  Gave number for [facility] downtown pharmacy to transfer. Thanks.

## 2021-05-30 NOTE — PROGRESS NOTES
[employer]    Open Encounter today.  Episodes created, care management status validated and encounters linked

## 2021-05-30 NOTE — PROGRESS NOTES
Second attempt to reach patient for hospital discharge follow up.  No answer, no VM.  RN has made two unsuccessful attempts to reach patient.   Encounter closed.

## 2021-05-30 NOTE — PROGRESS NOTES
See FRG notes from encounter 6/18/2019  Judith Joseph  [facility] Financial Resource Guide  [phone]

## 2021-05-30 NOTE — TELEPHONE ENCOUNTER
Seems like so far the haloperiodol is working to help decrease Bweh Say's bad behavior.  I am uncertain so far if we should give 50mg or 100mg on 8/1/2019.    Please take a verbal order for continued home care as stated by home care nurse. This is absolutely needed.

## 2021-05-30 NOTE — TELEPHONE ENCOUNTER
Please call the home nurse set to see Cony Say 7/3. Let him/her know the haloperidol injection was given at the [facility] 7/2 so they do not need to do it again 7/3.  thanks
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
## 2021-05-31 NOTE — TELEPHONE ENCOUNTER
Karla Huff TCM called back and states that Cony Say has been referred to an ACT team. She reports that Uvaldo Orona and her are working as a team to try and support Cony Say's case management needs. CHW will wait for Uvaldo to call back regarding follow up on SMRT paperwork and forms.

## 2021-05-31 NOTE — PROGRESS NOTES
Programs Applying For: [facility]:  Case #: [id number]  [facility] Worker: Angie #[phone]  [facility] #:   PMI #:   Tracking:   Date Applied:     Outreach:   8/15/2019:FRW received staff message that Karla Huff came to the [facility] this morning and took the SMRT forms, letter from PCP and medication list for Bweh Say and states that she will follow up with Uvaldo Orona as Bweh Say needs to sign the SMRT forms and the letter and med list are for [facility] so they know what medication patient needs.   FRW also received letter from Angie that SMRT forms have been received and is processing. FRW will make outreach in 1-2 months.   8/8/2019: FRW called [facility] and spoke with Lakesha. Certain population/6696A application has been received and assigned to Angie. FRW called Angie and left  requesting status. FRW will make outreach in 1 week to [place].   CLOSED ENCOUNTER:  7/3/2019: FRG called cousin and got the relationships for family members, FRG faxed applications to [facility] and will check in 3 weeks to make sure SMRT/Certain population application is processing.   7/2/2019: FRG called patient's cousin to gather demographic information (relationships) for applications.  left VM, FRG will make outreach call in 1 week.   CLOSED ENCOUNTER:  6/25/2019: FRG called patient's cousin to gather demographic information for applications.  left , FRG will make outreach call in 1 week.   6/25/2019: FRG met with patient and his cousin at [facility] and completed the Certain population application and 6696A. Patient's cousin is going to bring in the green card information for patient to [facility] today. FRG will call patient's cousin later today to get demographic information of the 6 individuals in the home and then send applications to [facility].   6/18/2019: FRG called patient to discuss referral and set up appointment to complete 6696A and Certain population application .  Appointment scheduled for 6/25 @ 10:00 am, outlook invitation sent to CG.       Health Insurance Information:       Referral:   Hi,     MADELYN Nobles would like this patient to start the SMRT process.       ThanksMechelle

## 2021-05-31 NOTE — TELEPHONE ENCOUNTER
"Karla will call [facility] Say's mom, Pauline Horvath this week to connect with her about [facility] Say's incoming mail that she is receiving. Due to a \"no contact order\" Pauline Horvath was unsure what do with his mail.    Karla will also devise next steps for [facility] Say's mail such as changing his mailing address to the halfway or another solution so that [facility] Say's mail will not come to Pauline Horvath's home indefinitely.     Karla Huff has the phone number for Pauline Horvath.  "
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
## 2021-05-31 NOTE — TELEPHONE ENCOUNTER
Karla Refugio came to the [facility] this morning and took the SMRT forms, letter from PCP and medication list for [facility] Say and states that she will follow up with Uvaldo Orona as Cony Say needs to sign the SMRT forms and the letter and med list are for [facility] so they know what medication patient needs.

## 2021-05-31 NOTE — TELEPHONE ENCOUNTER
----- Message from Jasmin Junior sent at 8/7/2019 10:21 AM CDT -----  Regarding: RE: [facility] TBI clinic  Dr. Church,    Per Marty Demarco, patient went to the appointment and saw Dr. Núñez on 07/31 but they will need a KELTON in order for us to get records.  I will mail out one to him.  Will also cancel his appointment with [facility] TBI clinic.    Jasmin Junior  08.07.19  ----- Message -----  From: Gena Church MD  Sent: 7/26/2019   7:25 PM  To: [facility]  Subject: [facility] TBI clinic                              Hi -  If possible, keep [facility] Say's [facility] TBI clinic appointment which was rescheduled for end of August, just in case the [facility] TBI clinic falls through (OK to cancel [facility] once [facility] has happened)  Thanks  Ines

## 2021-05-31 NOTE — TELEPHONE ENCOUNTER
Found out today that Cony Say is in the [facility] since 8/1/19.  I wrote a letter and we'll try to get a med list and other things he really needs to him.  Given how very long it has taken the mental health end to help him, this was inevitable. However, I hope with my letter and other notifications, Cony Say can get the safety and help he truly needs.

## 2021-05-31 NOTE — TELEPHONE ENCOUNTER
Please call this patient's pharmacy. As them to put a hold on his medications/deliveries as of today/now. He is currently in [facility] and getting supplies through the [facility] health program. We will notify them when he needs more meds.  thanks

## 2021-05-31 NOTE — PROGRESS NOTES
CHW spoke with RENÉ John CM, Cony Say is still in halfway, anticipated to be released on October or November.    No contact order placed and therefor Pauline Horvath cannot contact Cony Say and vice versa.    Karla Huff will contact Pauline Horvath and Dory Soto about Cony Say's incoming mail situation.      Next Outreach: 9/30/19    Plan:     - Call RENÉ Huff for updates on Cony Say

## 2021-06-01 NOTE — PROGRESS NOTES
Cony Valdez is still in nursing home, anticipated to be released at the end of October or in November.    Case Management through "[employer] is assisting patient with their TBI Waiver needs and will assist with housing once patient is released.      Next Outreach: 10/31/19    Plan:     - Follow up with NATO Huff from "[employer] for any updates on Cony Valdez.

## 2021-06-01 NOTE — PROGRESS NOTES
Clinic Care Coordination Contact  Care Team Conversations     9/11/19: [facility] SW received a voicemail from Mralo Deng who identified himself as a [facility] Waiver . He is looking to get in touch with pt and/or pt's family in order to arrange an assessment time that will allow consideration for the CADI and/or TBI waiver. This waiver assessment is critical as it will open up group home/assisted living options for pt post-incarceration.     SW immediately forwarded this information to pt's MH CM, Uvaldo Orona, and additionally left a return voicemail for Marlo with Uvaldo's direct contact information.     9/17/19: [facility] SW received another voicemail from Marlo Deng at [facility] requesting the same information as on 9/11/19. Another voicemail was left with contact information for pt's  CM Uvaldo Orona.     9/17/19 (cont): [facility] SUNDAR received a return phone call from Marlo Deng. He has successfully connected with pt's MH CM and plans to communicate with them from here on out regarding the CADI and/or TBI waiver assessment while pt is incarcerated.

## 2021-06-02 VITALS — WEIGHT: 118 LBS | BODY MASS INDEX: 23.16 KG/M2 | HEIGHT: 60 IN

## 2021-06-02 VITALS — WEIGHT: 112 LBS | HEIGHT: 60 IN | BODY MASS INDEX: 21.99 KG/M2

## 2021-06-02 VITALS — WEIGHT: 121 LBS | BODY MASS INDEX: 23.75 KG/M2 | HEIGHT: 60 IN

## 2021-06-02 VITALS — WEIGHT: 105 LBS | HEIGHT: 62 IN | BODY MASS INDEX: 19.32 KG/M2

## 2021-06-02 VITALS — WEIGHT: 114 LBS | HEIGHT: 60 IN | BODY MASS INDEX: 22.38 KG/M2

## 2021-06-02 VITALS — HEIGHT: 60 IN | BODY MASS INDEX: 22.28 KG/M2 | WEIGHT: 113.5 LBS

## 2021-06-02 VITALS — HEIGHT: 60 IN | WEIGHT: 118 LBS | BODY MASS INDEX: 23.16 KG/M2

## 2021-06-02 VITALS — BODY MASS INDEX: 22.38 KG/M2 | HEIGHT: 60 IN | WEIGHT: 114 LBS

## 2021-06-02 VITALS — BODY MASS INDEX: 21.8 KG/M2 | WEIGHT: 111.04 LBS | HEIGHT: 60 IN

## 2021-06-02 VITALS — WEIGHT: 114 LBS | BODY MASS INDEX: 22.38 KG/M2 | HEIGHT: 60 IN

## 2021-06-02 VITALS — WEIGHT: 112.12 LBS | BODY MASS INDEX: 22.01 KG/M2 | HEIGHT: 60 IN

## 2021-06-02 NOTE — TELEPHONE ENCOUNTER
"Per YISSEL Huff, [facility] Say was released from penitentiary on 10/15/19.    CHW reviewed media tab and does see that the [facility] ([facility]) is requesting records from [facility].    Patient is likely admitted to [facility].    CHW contacted the treatment center at [phone] to see if they would be able to confirm his admission and they report that they \"need to look into this\". CHW provided direct call back phone number and direct fax number.     It is assumed that they would need to get an KELTON from [facility] Say in order to speak with [facility] staff.      "

## 2021-06-02 NOTE — PROGRESS NOTES
Cony Say is being discharged from [facility] as he is admitted to [facility] for ongoing mental health treatment.    NATO Huff from [facility] is aware of this admission and discharge from [facility]. PCP Was notified as well.

## 2021-06-03 VITALS — HEIGHT: 60 IN | WEIGHT: 118.75 LBS | BODY MASS INDEX: 23.31 KG/M2

## 2021-06-15 NOTE — TELEPHONE ENCOUNTER
Jace () states that patient is being reassessed for services, need ICD code list. Faxed to requested number. Task complete.

## 2021-06-15 NOTE — TELEPHONE ENCOUNTER
Talked to Mom, they do not have a way of contacting pt at the moment, but she will have pt call to make appointment when they get a hold of him.

## 2021-06-15 NOTE — TELEPHONE ENCOUNTER
Jace called back stating he did not received the signed ICD fax yet. I reviewed the message from 9:33am with him. He was under the impression it was going to be fax today. (Does not seem like he understood the message earlier and he does not want to wait for the document to be scan in EHR and then fax). He states either way - he wants the signed ICD fax today. Can you please follow-up with him?

## 2021-06-15 NOTE — TELEPHONE ENCOUNTER
Please find out where this patient is currently seen. He is in a group home now, I believe (since being in [facility]). Mom reports he is going to get dental work done that will cost them $2000. Mom wants to find help to pay for this. However, I (SIRI Mackey) am uncertain that his reporting is correct. Still, it would be best to know where this patient is getting his care and checking to see if social work is needed (to find help for the dental bill).

## 2021-06-15 NOTE — TELEPHONE ENCOUNTER
Jace calls back to say that the  states that ICD 10 list must be signed by provider. CMT will take to provider and re-fax once signed.

## 2021-06-15 NOTE — TELEPHONE ENCOUNTER
Jace calls back today to notify writer that they state have not received the signed ICD code. Writer informed caller that fax was sent but not yet in medical record scanning. Writer notifies caller that we are happy to resend once scanned into EHR. Jace verbalizes his understanding. CMT will check 3/4/2021.

## 2021-06-15 NOTE — TELEPHONE ENCOUNTER
MD signed and dated ICD-10 as requested. Faxed back to Jace  as requested. Signed copy sent to medical record scanning.

## 2021-06-15 NOTE — TELEPHONE ENCOUNTER
Re-faxed as requested below (to requested fax number). If caller calls back, he needs to provide alternate fax number.

## 2021-06-15 NOTE — TELEPHONE ENCOUNTER
Please try either of these numbers to reach the patient. If he is not at these numbers - ask if they have his current phone number.  [phone]  If he is reached, see if he can come in TODAY, this afternoon as Dr. Church's schedule is open.

## 2021-06-16 PROBLEM — S06.9XAA TBI (TRAUMATIC BRAIN INJURY) (H): Status: ACTIVE | Noted: 2019-01-04

## 2021-06-16 PROBLEM — F29 PSYCHOSIS (H): Status: ACTIVE | Noted: 2019-07-03

## 2021-06-16 PROBLEM — R26.89 POOR BALANCE: Status: ACTIVE | Noted: 2019-01-04

## 2021-06-16 PROBLEM — R41.3 POOR MEMORY: Status: ACTIVE | Noted: 2019-01-04

## 2021-06-16 PROBLEM — F52.9 SEXUAL DISORDER: Status: ACTIVE | Noted: 2019-07-02

## 2021-06-16 PROBLEM — F41.0 PANIC DISORDER WITHOUT AGORAPHOBIA: Status: ACTIVE | Noted: 2019-05-11

## 2021-06-16 PROBLEM — F32.9 MAJOR DEPRESSION: Status: ACTIVE | Noted: 2019-03-27

## 2021-06-16 PROBLEM — R04.0 EPISTAXIS: Status: ACTIVE | Noted: 2019-07-02

## 2021-06-16 PROBLEM — K59.01 SLOW TRANSIT CONSTIPATION: Status: ACTIVE | Noted: 2019-02-07

## 2021-06-16 PROBLEM — R45.6 VIOLENT BEHAVIOR: Status: ACTIVE | Noted: 2019-01-04

## 2021-06-16 PROBLEM — F25.1 SCHIZOAFFECTIVE DISORDER, DEPRESSIVE TYPE (H): Status: ACTIVE | Noted: 2019-07-04

## 2021-06-16 NOTE — TELEPHONE ENCOUNTER
Telephone Encounter by Nohemi Sellers at 1/10/2019  2:05 PM     Author: Nohemi Sellers Service: -- Author Type: --    Filed: 1/10/2019  2:06 PM Encounter Date: 1/9/2019 Status: Signed    : Nohemi Sellers       PA initiated through [place] Medicaid

## 2021-06-16 NOTE — TELEPHONE ENCOUNTER
Telephone Encounter by Nohemi Sellers at 1/16/2019  4:05 PM     Author: Nohemi Sellers Service: -- Author Type: --    Filed: 1/16/2019  4:08 PM Encounter Date: 1/9/2019 Status: Addendum    : Nohemi Sellers    Related Notes: Original Note by Nohemi Sellers filed at 1/16/2019  4:06 PM       APPEAL DENIAL    DENIAL RATIONALE: upheld original denial reasoning         2ND LEVEL APPEAL INFORMATION: 2nd level appeals are handled by the clinic staff.  Information listed below

## 2021-06-16 NOTE — TELEPHONE ENCOUNTER
Telephone Encounter by Nohemi Sellers at 1/14/2019  2:44 PM     Author: Nohemi Sellers Service: -- Author Type: --    Filed: 1/14/2019  2:45 PM Encounter Date: 1/9/2019 Status: Signed    : Nohemi Sellers       PRIOR AUTHORIZATION DENIED    Denial Rational: Only approved for cancer chemotherapy patients         Appeal Information: This medication was denied. If physician would like to appeal because patient has contraindication or allergy to covered medication please write letter of medical necessity and route back to PA team to initiate.  If no further action is needed please close encounter thank you.
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
## 2021-06-22 NOTE — TELEPHONE ENCOUNTER
[facility] requesting Prior [facility]ization    Medication Name: Dronabinol 10mg capsule    Insurance Plan: [place] Medicaid   PBM:    Patient ID Number: [id number]    Please start PA process

## 2021-06-22 NOTE — TELEPHONE ENCOUNTER
Medication Question or Clarification  Who is calling: Pharmacy: [facility]  What medication are you calling about?: dronabinol (MARINOL)   What dose do you take?: 10 mg  How often are you taking the medication?: Sig - Route: Take 1 capsule (10 mg total) by mouth 2 (two) times a day before meals. - Oral  Who prescribed the medication?: Gena Church MD  What is your question/concern?: Pharmacy called because patient's cousin came to the pharmacy with paperwork and they were not able to communicate to patient. Marcy PSR contacted language line to get a Sasha . Transferred to writer and after conferring with pharmacist, Sasha Ruff  was able to relay that patient's prescription will be ready on Monday. In addition, Ree brought a prescription for a walker to the pharmacy. Relayed that an order request will be sent back to  and they will contact Ree with an .  Pharmacy: [facility]  Okay to leave a detailed message?: Yes  Site CMT - Please call the pharmacy to obtain any additional needed information.    Order Providers   Prescribing Provider Encounter Provider   Gena Church MD Everson, Jeanette Di, MD   Medication Detail    Disp Refills Start End    dronabinol (MARINOL) 10 MG capsule 60 capsule 0 1/4/2019     Sig - Route: Take 1 capsule (10 mg total) by mouth 2 (two) times a day before meals. - Oral    Sent to pharmacy as: dronabinol (MARINOL) 10 MG capsule    Notes to Pharmacy: For poor appetite and low BMI (18)    E-Prescribing Status: Receipt confirmed by pharmacy (1/4/2019  1:56 PM CST)    Pharmacy   [id number] - [place], [place] - [address] AT [facility] [address]     Medication Detail      Disp Refills Start End    haloperidol (HALDOL) 1 MG tablet 90 tablet 2 1/4/2019     Sig - Route: Take 1 tablet (1 mg total) by mouth 3 (three) times a day. - Oral    Sent to pharmacy as: haloperidol (HALDOL) 1 MG tablet    E-Prescribing Status:  Receipt confirmed by pharmacy (1/4/2019  2:23 PM CST)    Pharmacy   [id number] - [place], [place] - [address] AT [facility] [address]

## 2021-06-22 NOTE — TELEPHONE ENCOUNTER
When at the appointment, they asked for a cane. Insurance will not cover BOTH a can and a walker. I will order both, but they can only get one (I'm assuming they'll go to [facility]).
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
## 2021-06-22 NOTE — TELEPHONE ENCOUNTER
Left message x 1. Please review the notes below and advise the patient when they call back. Please schedule if indicated in the message. DME's for cane and walker faxed to [facility] with face sheet. Please advise when the call back.
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
## 2021-06-22 NOTE — PROGRESS NOTES
Interp: Emeral [id number]      Care Guide discussed enrolling in [facility]3] with patients cousin Day Mu and they report they are interested in scheduling a CCC RN Assessment as Bwsamir Say has a history of TBI with coma, and also is displaying some erratic behavior. [facility] Say is new to [place] and would like to be connected to supports for individuals with a TBI along with any additional community supports he may qualify for.      CCC RN Assessment scheduled for Wednesday 1/9 at 2pm      Next Outreach: Care Guide will call patient within 2 weeks of receiving the completed CCC RN Assessment from the CCC RN
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
## 2021-06-22 NOTE — TELEPHONE ENCOUNTER
Orders being requested: Walker   Reason service is needed/diagnosis: Patient has a TBI and has been having more difficulty walking. Patient's cousin Pauline was sent with an order after patient's appointment, she took it to [facility]. With the help of Sasha Ruff  she was able to explain an order request would have to be sent to Dr. Church and her office would contact them with an .  When are orders needed by: As soon as possible.  Where to send Orders: Please contact Pauline with a Sasha  once a medical supply that accepts their insurance is located and order is sent.  Okay to leave detailed message?  Yes [phone]

## 2021-06-23 NOTE — TELEPHONE ENCOUNTER
Clinic Care Coordination team has been trying to contact the patient and unable to reach. CCC team also has left vm to return call and we do have the referral in the WQ and will continue to reach. Please update patient's contact info if you have them.
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
## 2021-06-23 NOTE — PROGRESS NOTES
The Clinic Care Guide called the patient  today at the request of the PCP to discuss possible clinic care coordination enrollment. Clinic care coordination was described to the patient and immediate needs were discussed. The patient agreed to enrollment in clinic care coordination and future appointments were scheduled for an RN care coordination assessment and an enrollment visit with the primary care physician and care guide. The patient was provided with contact information for the clinic care guide.    [facility] date 01/21/2019.

## 2021-06-23 NOTE — TELEPHONE ENCOUNTER
PA appeal initiated through [place] [employer] faxed letter of medical necessity, clinic notes to [phone]. Marked as urgent.     Will await determination

## 2021-06-23 NOTE — TELEPHONE ENCOUNTER
Follow-up Requested  Contact patient to schedule: The 1/4/2019 referral to Care Management (Primary Care).  Reason for follow-up: Kiki needs patient to have help with all the care coordination the he needs.  How soon: asap.    Please call Kiki at [phone] when the Care Management appointment is set up with patient.

## 2021-06-23 NOTE — TELEPHONE ENCOUNTER
FYI: Per [facility], needs new script for Depakote 500mg (2) tabs daily.  which was increased on 1/25/19 from 500mg to 1000mg.   Patient has 7 days left.     Thanks,   Sonia Arrington CCC RN.
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
## 2021-06-23 NOTE — PROGRESS NOTES
Clinic Care Coordination Medication Education FOLLOW UP Visit    Patient presents for:  Medication education, Pill box teaching, Compliance monitoring and Medication reconciliation    Language: Sasha    Communication: Literate in other languages    Accompanied by: cousin, mother    Patient Living Situation:  Dependent with family    Primary Care Provider:  Gena Church MD    Barriers:  Financial, Language, Cultural, Poor insight into disease process, Chronic persistent mental illness and Memory deficits    Medication List (see cited below): Patient presents with all ordered medications    Current Outpatient Medications   Medication Sig     divalproex (DEPAKOTE ER) 500 MG 24 hour tablet Take 1 tablet (500 mg total) by mouth at bedtime.     dronabinol (MARINOL) 10 MG capsule Take 1 capsule (10 mg total) by mouth 2 (two) times a day before meals.     hydrOXYzine HCl (ATARAX) 25 MG tablet Take 1 tablet (25 mg total) by mouth 2 (two) times a day as needed for itching or anxiety. Or use for high anxiety     multivitamin with minerals tablet Take 1 tablet by mouth daily.     OLANZapine (ZYPREXA) 20 MG tablet Take 1 tablet (20 mg total) by mouth at bedtime.     senna-docusate (PERICOLACE) 8.6-50 mg tablet Take 2 tablets by mouth daily as needed for constipation.     traZODone (DESYREL) 50 MG tablet Take 1 tablet (50 mg total) by mouth at bedtime as needed, may repeat once for sleep.       Equipment: Basic pill box- twice daily dosing    Pill Box was cousin and checked by RN    Medication Set Up: Dependent  Medication Administration:  Dependent    Compliance: 100%    Future Appointments   Date Time Provider Department Center   2/6/2019  2:00 PM [facility]   2/13/2019 10:00 AM [facility]   2/15/2019  3:00 PM Gena Church MD N [facility] N Clinic   3/6/2019  1:20 PM Gena Church MD [facility] N Clinic   3/27/2019  6:20 PM [facility]   4/30/2019  3:30 PM Edith Welsh MD [facility]       Action Plan  RN Will:  No further assist needed from CCC RN    Care Guide Will:  Care Guide Delegation      Nursing Notes:    Patient came to see CCC RN for MEV today accompanied by his cousin.   They brought all his medications including his med box to the appt.   Missing MVI during visit today. Need new script per pharmacy. Sent message to PCP to address this. Cousin will add to med box once they deliver it.   Cousin set up meds x 1 week.   Refills needed for depakote. Per pharmacy needs new script. Depakote was increased from 500mg to 1000mg on 1/25/19 by PCP but wasn't updated on med list. PCP notified.     Cousin was able to verbalized how to set up medications correctly and independently. States she manages patient's medications and remind him to take his meds daily.    Added Atarax every morning for anxiety per cousin request. She will give to patient only as needed.     Reports patient can get anxious, and easily irritable but much better since she started on medications.     Instructed cousin to notify PCP with any changes in patient' mental status, behavior and when to call 911.     No further assist needed from CCC RN but will be available as needed.

## 2021-06-23 NOTE — PROGRESS NOTES
Pt is here to establish psychiatric care and medication management.   services used today. [facility] in house  Way [facility]sk[facility].  Pt gave verbal permission for sister to remain in room     PHQ-9: 4  Mood-Current: 0  Depression:  5   Anxiety: 5 lots of anxiety  Appetite:  Normal   Sleep: good     Specific concerns today: no new concerns       Denies SI/HI thoughts at this time.     [place] :  Unable to obtain for visit today due to recent process change with  program.     Correct pharmacy verified with patient and confirmed in snapshot? [x] yes []no    Charge captured ? [x] yes  [] no    Medications Phoned  to Pharmacy [] yes [x]no  Name of Pharmacist:  List Medications, including dose, quantity and instructions      Medication Prescriptions given to patient   [] yes  [x] no   List the name of the drug the prescription was written for.       Medications ordered this visit were e-scribed.  Verified by order class [x] yes  [] no  Depakote  mg; hydroxyzine HCl 25 mg; Zyprexa 20 mg; Trazodone 50 mg   Medication changes or discontinuations were communicated to patient's pharmacy: [x] yes  [] no  Cx'd Dr. Church's refills remaining for: hydroxyzine HCl; Zyprexa 20 mg; Trazodone 50 mg    UA collected [] yes  [x] no     [place] Prescription Monitoring Program Reviewed? [] yes  [x] no    Referrals were made to:  Referral to TBI clinic & Adult PT    Future appointment was made: [x] yes  [] no  4/30/19  Dictation completed at time of chart check: [] yes  [x] no    I have checked the documentation for today s encounters and the above information has been reviewed and completed.

## 2021-06-23 NOTE — PROGRESS NOTES
Interp: Saw [id number]    Attempt 1: Care Guide called patient but was unable to reach patient after calling twice.  If this patient is returning my call, please transfer to Mechelle Grant at ext [phone].      Next Outreach: 1/30/2019

## 2021-06-23 NOTE — TELEPHONE ENCOUNTER
I believe this patient deserves special consideration for Marinol. He has traumatic brain injury and therefore some explosive outbursts of anger. Since suffering from this, he eating has decreased and sometimes he does not eat. While medication is obviously needed to help him, I think some regular eating to sustain normal blood sugars is important, too. I only meant for this med to be used temporarily, while we get him situated on medications.

## 2021-06-23 NOTE — PROGRESS NOTES
My Clinic Care Coordination Care Plan    [facility]  [address], [address] [place]  [address]  [phone]      My Preferred Method of Contact:  Phone: [phone]    My Primary/Preferred Language:  Sasha    Preferred Learning Style:  Face to face discussion, Pictures/Diagrams and Hands on teaching    Emergency Contact: Extended Emergency Contact Information  Primary Emergency Contact: Mu,Day  Address: [address] [place] [place] [address] [facility]  Home Phone: [phone]  Mobile Phone: [phone]  Relation: Cousin  Preferred language: Sasha  Secondary Emergency Contact: declined,declined  Relation: Declined     PCP:  Gena Church MD  Specialists:    Care Team            Gena Church MD   [phone] PCP - General, Family Medicine    [facility] Care Coordination Care Guide, Clinic Care Coordination    [phone]    Sonia Arrington RN Registered Nurse, Clinic Care Coordination          Hospitalizations and/or ED Visits  Most Recent: 1/16/19     Previous:  1/10/19  Reason:  Behavioral Health and Suicidal Ideation    Concerns regarding my health I am most concerned about how to get connected with needed mental health supports in the community, housing, SSI, and help with managing my medications.    Advanced Directive/Living Will: The patient was given information regarding Adanced Directives/Living Will      [facility] elected to enroll in the [facility] Care Coordination.  [facility] was given a copy of the Clinic Care Coordination brochure and full description of how to access their care team both during clinic hours and after hours.   My Care Guide's Name and Phone Number:  Mechelle Grant [phone]  The Care Guide and myself agreed to be in contact monthly.      Medication Update  The patient's medication list is up to date per patient    Health Maintenance and Immunization Update  The patient's preventive health screenings and  immunizations are up to date per patient.    My Emergency Plan  Emergency Plan Recommendation:     When to Use the Emergency Department (ED)  An emergency means you could die if you don t get care quickly. Or you could be hurt permanently (disabled). Read below to know when to use -- and when not to use -- an emergency department (also called ED).     Dangers to your life  Here are examples of emergencies. These need immediate care:  A hard time breathing  Severe chest pain  Choking  Severe bleeding  Suddenly not able to move or speak  Blacking out (fainting)`  Poisoning     Dangers of permanent injuries  Here are other emergencies. These also need immediate care:  Deep cuts or severe burns  Broken bones, or sudden severe pain and swelling in a joint     When it s an emergency  If you have an emergency, follow these steps:     1. Go to the nearest emergency department  If you can, go to the hospital ED closest to you right away.  If you cannot get there right away, or if it is not safe to take yourself, call 911 or your police emergency number.  2. Call your primary care doctor  Tell your doctor about the emergency. Call within 24 hours of going to the ED.  If you cannot call, have someone call for you.  Go to your doctor (not the ED) for any follow-up care.     When it s not an emergency  If a problem is not an emergency, follow these steps:     1. Call your primary care doctor  If you don t know the name of your doctor, call your health plan.  If you cannot call, have someone call for you.  2. Follow instructions  Your doctor will tell you what you should do.  You may be told to see your doctor right away. You may be told to go to the ED. Or you may be told to go to an urgent care center.  Follow your doctor s advice.  Heart Failure: Tracking Your Weight  You have a condition called heart failure. When you have heart failure, a sudden weight gain or a steady rise in weight is a warning sign that your body is  retaining too much water and salt. This could mean your heart failure is getting worse. If left untreated, it can cause problems for your lungs and result in shortness of breath. Weighing yourself each day is the best way to know if you re retaining water. If your weight goes up quickly, call your doctor. You will be given instructions on how to get rid of the excess water. You will likely need medicines and to avoid salt. This will help your heart work better.  Call your doctor if you gain more than 2 pounds in 1 day, more than 5 pounds in 1 week, or whatever weight gain you were told to report by your doctor. This is often a sign of worsening heart failure and needs to be evaluated and treated. Your doctor will tell you what to do next.      Tips for weighing yourself       Weigh yourself at the same time each morning, wearing the same clothes. Weigh yourself after urinating and before eating.    Use the same scale each day. Make sure the numbers are easy to read. Put the scale on a flat, hard surface -- not on a rug or carpet.    Do not stop weighing yourself. If you forget one day, weigh again the next morning.     How to use your weight chart    Keep your weight chart near the scale. Write your weight on the chart as soon as you get off the scale.    Fill in the month and the start date on the chart. Then write down your weight each day.  If you miss a day, leave the space blank. Weigh yourself the next day and write your weight in the next space.    Take your weight chart with you when you go to see your doctor.    All [facility] clinic patients have access to a Nurse 24 hours a day, 7 days a week.  If you have questions or want advice from a Nurse, please know [facility] is here for you.  You can call your clinic and they will connect you or you can call Care Connection at [phone].  [facility] also has Walk In Care clinics in multiple locations.  Call the number listed above for more information about  our Walk In Care clinics or visit the [employer] website at [email or web address].    Patient Support  I will ask my emergency contact for help in supporting me in these goals.  Relationship to patient: Aunt  Cell # : [phone] , best time to call daytime

## 2021-06-23 NOTE — PROGRESS NOTES
Interp: Shadi [id number]    Care Guide called [facility] to remind him that he has his Citizenship Waiver appointment at [facility] on Tuesday 2/5 at 1pm.    [facility] reports he has received his cane. (Goal completed)          Next Outreach: 2/6/19    - Reminder about today's [facility] appointment, bring ALL MEDS!

## 2021-06-23 NOTE — PROGRESS NOTES
Interp: Ioana [id number]      Care Guide called and provided a reminder about today's [facility] appointment that is today at 2pm and it is very important that he bring all of his medications.    Next Outreach: 2/12/19     - Reminder about tomorrow's [facility] appointment at 10am
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
## 2021-06-23 NOTE — PROGRESS NOTES
Clinic Care Coordination Medication Education FOLLOW UP Visit    Patient presents for:  Medication education, Compliance monitoring and Medication reconciliation    Language: Sasha    Communication: Literate in other languages    Accompanied by: moisessin, Dory Soto    Patient Living Situation:  Dependent with family    Primary Care Provider:  Gena Church MD    Barriers:  Financial, Language, Cultural, Poor insight into disease process, Inadequate support at home, Multiple uncontrolled disease states, Chronic persistent mental illness and Memory deficits    Medication List (see cited below): No medications present    Current Outpatient Medications   Medication Sig     divalproex (DEPAKOTE ER) 500 MG 24 hour tablet Take 1 tablet (500 mg total) by mouth at bedtime.     dronabinol (MARINOL) 10 MG capsule Take 1 capsule (10 mg total) by mouth 2 (two) times a day before meals.     hydrOXYzine HCl (ATARAX) 25 MG tablet Take 1 tablet (25 mg total) by mouth 2 (two) times a day as needed for itching or anxiety. Or use for high anxiety     multivitamin with minerals tablet Take 1 tablet by mouth daily.     OLANZapine (ZYPREXA) 20 MG tablet Take 1 tablet (20 mg total) by mouth at bedtime.     traZODone (DESYREL) 50 MG tablet Take 1 tablet (50 mg total) by mouth at bedtime as needed, may repeat once for sleep.       Equipment: forgot to bring med box today      Medication Set Up: Dependent  Medication Administration:  Dependent    Compliance: 100%    Future Appointments   Date Time Provider Department Center   1/30/2019  2:00 PM [facility]   2/13/2019 10:00 AM [facility]   2/15/2019  3:00 PM [facility]   3/6/2019  1:20 PM [facility]   3/27/2019  6:20 PM [facility]   4/30/2019  3:30 PM Edith Welsh MD [facility]       Action Plan  RN Will:      Care Guide Will:  Care Guide  Delegation      Nursing Notes:    Patient came to appt today accompanied by his cousin (female) and her 7 yr old son. His cousin drove him here.   Patient's cousin, Dory Soto states they forgot to bring his mediations to his appt today but she knows what medications he's taking because she's the one who's managing his medications.   Writer printed out current med list and went through each medication with patient's cousin without prescription bottles.    1) Depakote 500mg 24hr - states still taking (1) tab since last visit with writer instead of (2) as directed by PCP on 1/25/19 during clinic visit with PCP. Patient's cousin was instructed to increased Depakote to 2 tabs at bedtime but states she hasn't added another 500mg tab to the med box. Patient has been only taking 1 tab at bedtime.  2) Marinol - States never started on this medication because insurance doesn't cover.  3) Hydroxyzine 25mg - States hasn't start taking it yet. Educated patient and cousin to take (1) tab for itching and anxiety and he can take up to two times a day as needed. Cousin verbalized back understanding. Need further teaching.  4) Multivitamin - states taking it daily.   5) Olanzapine 20mg - states still taking 15mg daily, has not start taking 20mg tab yet since it was delivered. Reports Zyprexa 20mg tab was delivered by [facility] on Monday and confirmed by the pharmacy today.   6) Trazodone 50mg. Reports taking it every night otherwise patient doesn't sleep per cousin.     Cousin states she forgot to increase depakote to 2 tabs and give Zyprexa 20mg when delivered as directed because she's overwhelmed with all patient and his mom's appts and taking care of her family.    Educated patient's cousin the important of giving patient medications as prescribed or as directed with his condition and to notify writer and PCP with increase verbal outburst and to call 911 with any violence outburst that they feel threaten, suicidal ideations,  "thoughts of harming others.     Cousin reports improved sleep noted since discharged from the [facility] inpatient but occasional verbal outburst when he gets annoyed or being nagged. He likes to left alone. Patient states \" I feel lonely. I have no friends here. I have nothing to do. The Wifi is not working in the apartment. I like to draw and I have nothing to draw on. I draw on my mom's shirt and I cut them in pieces.\"    States he would like to go back to school one day but he wants to make sure his mental health is at it's best before he can go back to school.     States \" I would like to get my citizenship first. Can you help me with the process?\".     Writer will ask CCC CG make appt for patient to see PCP for [id number] waiver forms to fill out by PCP then CCC team will referral patient to [facility] or Dinesh Rogel ( Ewa Baez), [facility]. ( appt was made on 2/5/19 to see PCP for citizenship waiver)    Patient requesting a cane. Writer will notify PCP in person. Will ask CCC CG to f/u on status.               "

## 2021-06-23 NOTE — PROGRESS NOTES
Interp: Stephen [id number]    Care Guide spoke with Pauline Horvath about Cony Say's [facility] Care Plan.    Care Guide reminded Pauline Horvath that Cony Say has an appointment today at 4pm at [facility].    Care Guide reminded Pauline Horvath that Cony Say has an appointment tomorrow at [facility] at 2pm and Cony Say should bring his medications to this appointment.      Next Outreach: 2/13/19     - Reminder about this morning's 10am  appointment (PCA, housing, CADI, SSI)   - Has Cony Say received his cane?

## 2021-06-23 NOTE — PROGRESS NOTES
"TCM DISCHARGE FOLLOW UP CALL    Discharge Date:  1/16/2019  Reason for hospital stay (discharge diagnosis)::  Psychosis associated with intensive care  Are you feeling better, the same or worse since your discharge?:  Patient is feeling better (\"Sometimes he's happy & sometimes he doesn't look quite happy, but he's a little better.\")  Do you feel like you have a plan in the event of a health emergency?: Yes (Call 911)    As part of your discharge plan, were  home care services ordered for you?: No    Did you receive any new medications, or was there a change to your medications?: Yes    Are you taking those medications, or do you have any established regiment?:  RN reviewed discharge medications w/pt.  Pt's mom states \"he is taking his medicines on time.\"  Do you have any follow up visits scheduled with your PCP or Specialist?:  Yes, with PCP and Yes, with Specialist (Dr. Church 1/25/19)  (RN) Is PCP appt scheduled soon enough (within 14 days of discharge date)?: Yes    Who are you seeing and when is it scheduled?:  Dr. Welsh (psychiatrist) 1/29/19  RN NOTES::  Mom states \"we need one person to take care of him full time, if not we have to follow him & look after him all the time.\"  Pt has appt w/[facility] RN 1/21/19 in clinic at 10:00am - RN advised mom talk w/RN at [facility] appt 1/21/19 to see what options might be available for assistance in helping care for pt.  Pt's mom verbalized understanding & will discuss at appt next week.         Bel Walker RN Care Manager, Population Health    "

## 2021-06-24 NOTE — TELEPHONE ENCOUNTER
CMT attempted to reach the patient x 3. CMT unable to leave voicemail, unable to reach the patient. How should CMT proceed?

## 2021-06-24 NOTE — TELEPHONE ENCOUNTER
CMT notified patient per nurse note below.     Please route form to [place]  and patient will come to the clinic to sign the form.

## 2021-06-24 NOTE — PROGRESS NOTES
"Assessment  SW mett first with pt and  Ibrahima Lin to discuss resources for English classes and citizenship process.  Due to pts TBI it is difficult to discuss these topics without family present.  Pt seems to understand what is being said per  but responds a lot of the time with hand gestures.     SUNDAR gives pt information for [facility] where he can take English classes. SUNDAR encourages him to have his cousin (he calls sister) Day Charles go with him to learn more.    Pt say he has been in  7 years and needs to become a citizen before he can apply for SSI. SUNDAR assisted pt in calling [email or web address] to complete intake.  Pt does not know household income which is needed in order to qualify for  assistance.   from [facility] [[email or web address] is going to have Sasha speaking staff contact pts cousin Day Mu to confirm household income.    When pt arrive in lobby pts cousin Day Mu there and requested to speak to SW.  Pt preferred to stay in [facility]- gave permission for cousin to speak to SW.    SUNDAR informed cousin of [facility] resource given to pt and that Sasha speaking staff from [facility] will be calling her to complete intake for  for assistance with citizenship.    Day Mu says pt and pts mother Ree Heh argue.  She will take pt and her children and stay with her 's family to separate them.  Day Charles says pts mother has \"worry\" and anxiety.  Day Mu would someday like for pt and his mother to live in their own apartment.  Day Charles confirms pt on wait list for PCA assessment.  She does not want to be PCA but says \"he needs someone to help him.\" SUNDAR explained it will likely be a long time before pt will have income from SSI for housing. Day Charles aware it will take time and is willing to continue to have pt live with her.       Action Plan:    will:  1)  Consult with with Nataly from [facility] on services for pt and mother      Care Guide will:  See updated goals.  Continue outreach per standard work "       Subjective     NA      Objective    See above    Goals        Patient Stated      I will complete [facility] intake to get connected to  in the next 2 months (pt-stated)      Action steps to achieve this goal    1.  My sister will help me complete [facility] intake and provide all necessary documentation   2.  I complete all paperwork edward timely manner  3.  I will update my care guide at outreach calls    Date goal set:  2/13/19- AJ        I will go to [facility] in the next 2 months to learn more about classes  (pt-stated)      Action steps to achieve this goal  1. I will speak with the [facility] CG at outreach telephone calls.   2.  I will provide any necessary paperwork/documentation in a timely manner if needed to assist with this process      Date goal set:  1/21/19  Updated:  2/13/19        I would like a [facility] Assessment completed for PCA services and waiver services for my ADLs. (pt-stated)      Action steps to achieve this goal  1. I will speak with the [facility] CG at outreach telephone calls.   2.  I will speak a Sasha speaking PCA agency and ask to assist me with PCA assessment.   3.  I will provide any necessary paperwork/documentation in a timely manner if needed to assist with this process.        Date goal set:  1/21/19    Discussed: 2/12/19

## 2021-06-24 NOTE — TELEPHONE ENCOUNTER
No one has reached Dre, no notes to indicate.  Need pt to sign KELTON as none signed.  Called pt to have come in to sign KELTON.  Thanks.

## 2021-06-24 NOTE — TELEPHONE ENCOUNTER
Cony Valdez came into the clinic and signed the [facility] Medical Opinion Form Authorization for Release of Information so that the PCP can complete the form and have it faxed to the [facility] for review.    The Medical Opinion Form has been handed to the [facility] to be given to Dr. Church.

## 2021-06-24 NOTE — PROGRESS NOTES
SW completed and faxed referral to [facility] for [facility].  SW discussed referral and family situation with Nataly from [facility].

## 2021-06-24 NOTE — PROGRESS NOTES
Interp: [id number] [facility]    Care Guide informed Cony Say that he has a CCC SW appointment tomorrow morning at 10am and it is very important that he attend this appointment because the SW is not available again for a month. He reports he understands.    Bwsamir Say reports no other concerns or questions today.      Next Outreach: 3/13/19

## 2021-06-24 NOTE — TELEPHONE ENCOUNTER
Patient states that she already signed a form and is not sure if something else needs to be signed. If this is done, please close encounter. If something else needs to be signed, nurse to call.

## 2021-06-24 NOTE — TELEPHONE ENCOUNTER
Care Guide consulted with the [facility] SW and attempted to reach the patient about them being closed with [facility] due to not being able to reach the patient or family member.    Care Guide was not able to reach the patient either.    Care Guide spoke with [facility] who reports that he can reopen his [facility] case but he needs to be reachable by phone in order for them to work with him or the family around his citizenship concerns.    Danelle Alonzo is following up with the family again on 3/13/2019 to try and talk with them about the citizenship goal [facility] Say has and the importance of them speaking with [facility] if they want help with the citizenship process. Care Cheri will attempt to schedule a [facility] appointment for samir Valdez for a follow up.       Patient did receive the N-684 form.

## 2021-06-24 NOTE — TELEPHONE ENCOUNTER
Called patient to tell him to come in a sign KELTON for [facility] regarding the medical opinion form. Pt was not available. Please call patient to come in when possible to sign KELTON so that form can be faxed. Thanks.

## 2021-06-24 NOTE — TELEPHONE ENCOUNTER
Called pt and left message to call back to clinic.  Ok to relay message should pt call back.  Thanks.
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
## 2021-06-24 NOTE — PROGRESS NOTES
Interp: [facility] [id number]      Care Guide spoke with Charles Connors who reports that Cony Say is not home right now. Care Guide informed Charles Connors that if Cony Say needs the doctor to provide the county with his health information then he will need to come to the clinic to sign the Authorization for Release of Information for the Medical Opinion. Charles Connors reports that she understands that Cony Say can come to the clinic and tell the  that there is a form he needs to sign and the  will alert the Care Guide to come to front. Care Cheri encouraged Charles Connors to have Cony say come tomorrow morning between 8-11am to sign this form. Charles Connors reports she understands.      Next Outreach: 3/13/19     - Have you gone to the [facility] regarding school?   - Have you spoken with [facility] on the phone regarding citizenship case?   - Has the PCA Assessment been scheduled?

## 2021-06-24 NOTE — PROGRESS NOTES
Interp: [facility] [id number]      Care Guide called Charles Connors back and informed her again that it is important Bwsamir Say come to the clinic to sign the KELTON for [facility] as Bweh Say reported he needs the PCP to release his medical information to the [facility]. Care Cheri informed  Dory that they are aware the weather is bad today and they can come tomorrow morning, preferably 9-11am or 1-3pm    [facility] reports that they have not heard from anyone to schedule a PCA Assessment.    [facility] reports that they have not received a call from [facility]  office regarding Bweh Say.    [facility] reports that Cony Say has not gone to the [facility] but they do have the information.      Next Outreach: 3/13/19     - Have you gone to the [facility] regarding school?   - Have you spoken with [facility] on the phone regarding citizenship case?   - Has the PCA Assessment been scheduled?

## 2021-06-24 NOTE — TELEPHONE ENCOUNTER
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 He must come in and sign it, then he can take it. A copy was made to be scanned onto his chart (this copy does not have his signature but that's OK). The original is in the accordian file at the .  Also, there is a new medication for him to take at the pharmacy. Propranolol. He is to take it three times daily AND if he has an angry outburst. He should take propranolol regularly and use the hydroxyzine only as needed.

## 2021-06-24 NOTE — PROGRESS NOTES
Interp: [place] [id number]    Attempt 1: Care Guide called patient regarding CCC goals but was not able to reach anyone.  If this patient is returning my call, please transfer to Mechelle Grant at ext [phone].      Next Outreach: 3/22/2019     - [facility], you can try and re-open the case.   - Was Nataly from [facility] able to connect with you? If yes, do you have an [facility] worker now?   - Did you go to the [facility] yet?

## 2021-06-24 NOTE — TELEPHONE ENCOUNTER
I need documentation of this patient's brain injury (May and Sept 2018) from the hospital in [place] where he was hospitalized twice. He had paperwork from [facility], [place], [place]. Please check and see if we successfully reached them and have documentation coming OR if we need to do something yet to get it (I'm quite certain the patient has signed the KELTON for it). Legally, I MUST have documentation of what he and his family report, for the SSI and other services he is being connected with.
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
## 2021-06-25 NOTE — PROGRESS NOTES
Interp: [facility] [id number]    Care Guide explored CCC goals, see goals for updates.    No longer interested in [facility] goal as no one is available to bring him to the [facility].      Addendum: Nataly from [facility] called the Care Guide back and reports that she plans to see [facility] Say next week for his [facility] intake assessment.        Next Outreach: 4/23/19     - Any call from [facility]?   - ARM intake completed?   - [facility] Assessment scheduled yet?

## 2021-07-03 NOTE — ADDENDUM NOTE
Addendum Note by Kaushik Welsh MD at 4/2/2019  5:00 PM     Author: Kaushik Welsh MD Service: -- Author Type: Physician    Filed: 4/2/2019 11:16 PM Encounter Date: 4/2/2019 Status: Signed    : Kaushik Welsh MD (Physician)    Addended by: KAUSHIK WELSH on: 4/2/2019 11:16 PM        Modules accepted: Orders

## 2021-07-03 NOTE — ADDENDUM NOTE
Addendum Note by Aj Church MD at 6/20/2019 10:04 AM     Author: Aj Church MD Service: -- Author Type: Physician    Filed: 6/20/2019 10:04 AM Encounter Date: 6/14/2019 Status: Signed    : Aj Church MD (Physician)    Addended by: AJ CHURCH on: 6/20/2019 10:04 AM        Modules accepted: Orders

## 2021-07-03 NOTE — ADDENDUM NOTE
Addendum Note by Aj Church MD at 1/4/2019  1:20 PM     Author: Aj Church MD Service: -- Author Type: Physician    Filed: 1/7/2019  1:40 PM Encounter Date: 1/4/2019 Status: Signed    : Aj Church MD (Physician)    Addended by: AJ CHURCH on: 1/7/2019 01:40 PM        Modules accepted: Orders

## 2021-07-14 PROBLEM — F29: Chronic | Status: RESOLVED | Noted: 2019-01-10 | Resolved: 2019-02-07
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
## 2022-09-04 NOTE — TELEPHONE ENCOUNTER
"Hi Dr Cherry,    I also cc Dr. Church.  I received a massage that [facility] was prescribed  \"Haloperidol decanoate 50 mg Intramuscular Every 30 days, For use on Mental Health Unit ONLY. Use standard Z-track technique when administering this medication. Preferred site of administration is the gluteal or deltoid muscle.\" But I checked with his pharmacy and they haven't receive any prescription called in for the Haloperidol.    If [facility] is to get the haloperidol injection. The prescription can be call to [facility] [phone]. When should he start his 1st dose?    Stefani Mcdowell, RN  HE    "
Hi -  I ordered the haloperidol thinking it would be given here at the clinic. If a home nurse is going to give it, should I send the order to [facility]? As the prescriber, I want him to get it as soon as it can be arranged. He has persistent odd/bad behavior and I am hoping this can help.  
I called the pharmacy - all is set!  
I just called the medication in to [facility]  Haloperidol dcanoate 50mg  Give 50mg IM once per month  Refill x2    I hope home nursing can give this ASAP. While I put refills on it, I will likely want to do a higher dose next month.  I waited until today to call in the med because I wanted to know about his oral med compliance prior to the IM med being given. Yesterday, I was updated and his oral med compliance is poor, as suspected.  
No, nothing has been given at the clinic - in fact I haven't seen him for a while. He should get it ASAP. He is supposed to have a social work visit tomorrow 6/18 in the clinic. If the nurse-only appointments have room, he could be booked for that while he is here and we could give the shot. However, the plans is for the home nurse to give it.  
04-Sep-2022 21:35

## 2022-10-21 ENCOUNTER — TELEPHONE (OUTPATIENT)
Dept: FAMILY MEDICINE | Facility: CLINIC | Age: 22
End: 2022-10-21

## 2022-10-21 NOTE — TELEPHONE ENCOUNTER
Called [facility] in [facility] and relayed the message. The nurse who answered the phone routed message as high priority to MD. They said they will call pt to see if he'll give consent to reach out to Dr. Church. They said to expect a call back soon.      Neri Valdez, BSN RN  [facility]

## 2022-10-21 NOTE — TELEPHONE ENCOUNTER
Please call the [facility] in [place]. Leave the following message for Dr. Moises Harmon:    Cony Say's previous PCP at the [facility], Gena Church MD, was told Cony Say's time to apply for citizenship is now. The timing on this is critical which is why she is reaching out (if he does not attain citizenship within the allotted time, he can lose all eligibility for social supports).    If Cony Say gives consent, Dr. Harmon can reach out to Dr. Church for help writing up an N648 form so he can apply for citizenship but not have to take the citizenship exam. This form is something Dr. Church is familiar with. She can be reached at the [facility] [phone]

## 2022-10-26 ENCOUNTER — NURSE TRIAGE (OUTPATIENT)
Dept: NURSING | Facility: CLINIC | Age: 22
End: 2022-10-26

## 2022-10-26 NOTE — TELEPHONE ENCOUNTER
I called the doctor back at 4:55. While they tried to reach Dr. De Leon, the  was not able to get anyone at his clinic to  on the line. I left my personal cell number for him to call me, but I'll also try to call him on Friday if I can.

## 2022-10-26 NOTE — TELEPHONE ENCOUNTER
Nurse Triage SBAR    Is this a 2nd Level Triage? NO    Situation: Dr Moises Harmon returning Dr Church's call regarding this pt. .      Background: Dr Church is to call  Dr Moises Harmon - [phone]  He will be available until 5pm today or call Friday as he is off tomorrow.       Tamia Mijares RN [facility] 10/26/2022 3:44 PM                              Reason for Disposition    Information only question and nurse able to answer    Protocols used: INFORMATION ONLY CALL - NO TRIAGE-A-[place]

## 2023-03-20 NOTE — TELEPHONE ENCOUNTER
"Care Guide would like to know if [facility] Say can be scheduled for a 40 minute Office Visit to complete the Citizenship Waiver form in two \"reserved\" spots on Tuesday 2/5 at 1 and 1:20pm.  "
YES - that is fine and so needed.  
Cough